# Patient Record
Sex: FEMALE | Race: BLACK OR AFRICAN AMERICAN | Employment: OTHER | URBAN - METROPOLITAN AREA
[De-identification: names, ages, dates, MRNs, and addresses within clinical notes are randomized per-mention and may not be internally consistent; named-entity substitution may affect disease eponyms.]

---

## 2017-01-09 ENCOUNTER — TELEPHONE (OUTPATIENT)
Dept: FAMILY MEDICINE CLINIC | Facility: CLINIC | Age: 62
End: 2017-01-09

## 2017-01-19 ENCOUNTER — TELEPHONE (OUTPATIENT)
Dept: FAMILY MEDICINE CLINIC | Facility: CLINIC | Age: 62
End: 2017-01-19

## 2017-01-25 ENCOUNTER — CASE MANAGEMENT (OUTPATIENT)
Dept: CASE MANAGEMENT | Age: 62
End: 2017-01-25

## 2017-01-26 ENCOUNTER — OFFICE VISIT (OUTPATIENT)
Dept: FAMILY MEDICINE CLINIC | Facility: CLINIC | Age: 62
End: 2017-01-26

## 2017-01-26 VITALS
HEIGHT: 64 IN | SYSTOLIC BLOOD PRESSURE: 145 MMHG | BODY MASS INDEX: 24.84 KG/M2 | OXYGEN SATURATION: 98 % | DIASTOLIC BLOOD PRESSURE: 84 MMHG | WEIGHT: 145.5 LBS | HEART RATE: 88 BPM

## 2017-01-26 DIAGNOSIS — G83.9 PARESIS (HCC): ICD-10-CM

## 2017-01-26 DIAGNOSIS — R04.0 EPISTAXIS: ICD-10-CM

## 2017-01-26 DIAGNOSIS — I10 HTN (HYPERTENSION), BENIGN: ICD-10-CM

## 2017-01-26 DIAGNOSIS — Z79.4 UNCONTROLLED TYPE 2 DIABETES MELLITUS WITH HYPERGLYCEMIA, WITH LONG-TERM CURRENT USE OF INSULIN (HCC): ICD-10-CM

## 2017-01-26 DIAGNOSIS — M19.90 ARTHRITIS: Primary | ICD-10-CM

## 2017-01-26 DIAGNOSIS — K62.5 BRBPR (BRIGHT RED BLOOD PER RECTUM): ICD-10-CM

## 2017-01-26 DIAGNOSIS — E11.65 UNCONTROLLED TYPE 2 DIABETES MELLITUS WITH HYPERGLYCEMIA, WITH LONG-TERM CURRENT USE OF INSULIN (HCC): ICD-10-CM

## 2017-01-26 PROCEDURE — 99999 PR OFFICE/OUTPT VISIT,PROCEDURE ONLY: CPT | Performed by: INTERNAL MEDICINE

## 2017-01-26 RX ORDER — NAPROXEN 250 MG/1
500 TABLET ORAL DAILY PRN
Qty: 60 TABLET | Refills: 0 | Status: SHIPPED | OUTPATIENT
Start: 2017-01-26 | End: 2017-02-17 | Stop reason: ALTCHOICE

## 2017-01-26 RX ORDER — TRAZODONE HYDROCHLORIDE 50 MG/1
50 TABLET ORAL NIGHTLY
Qty: 30 TABLET | Refills: 5 | Status: SHIPPED | OUTPATIENT
Start: 2017-01-26 | End: 2017-01-30 | Stop reason: SDUPTHER

## 2017-01-26 RX ORDER — ASPIRIN 81 MG/1
81 TABLET ORAL DAILY
Qty: 30 TABLET | Refills: 3 | Status: SHIPPED | OUTPATIENT
Start: 2017-01-26 | End: 2017-05-17 | Stop reason: SDUPTHER

## 2017-01-26 ASSESSMENT — ENCOUNTER SYMPTOMS
RESPIRATORY NEGATIVE: 1
DIARRHEA: 0
NAUSEA: 0
CONSTIPATION: 1
EYES NEGATIVE: 1
BLOOD IN STOOL: 1

## 2017-01-30 DIAGNOSIS — E11.65 UNCONTROLLED TYPE 2 DIABETES MELLITUS WITH HYPERGLYCEMIA, WITH LONG-TERM CURRENT USE OF INSULIN (HCC): ICD-10-CM

## 2017-01-30 DIAGNOSIS — Z79.4 UNCONTROLLED TYPE 2 DIABETES MELLITUS WITH HYPERGLYCEMIA, WITH LONG-TERM CURRENT USE OF INSULIN (HCC): ICD-10-CM

## 2017-02-08 ENCOUNTER — TELEPHONE (OUTPATIENT)
Dept: FAMILY MEDICINE CLINIC | Facility: CLINIC | Age: 62
End: 2017-02-08

## 2017-02-09 ENCOUNTER — TELEPHONE (OUTPATIENT)
Dept: NEUROLOGY | Age: 62
End: 2017-02-09

## 2017-02-13 ENCOUNTER — CASE MANAGEMENT (OUTPATIENT)
Dept: CASE MANAGEMENT | Age: 62
End: 2017-02-13

## 2017-02-13 RX ORDER — TRAZODONE HYDROCHLORIDE 50 MG/1
50 TABLET ORAL NIGHTLY
Qty: 30 TABLET | Refills: 3 | OUTPATIENT
Start: 2017-02-13 | End: 2017-06-01 | Stop reason: SDUPTHER

## 2017-02-17 ENCOUNTER — CASE MANAGEMENT (OUTPATIENT)
Dept: CASE MANAGEMENT | Age: 62
End: 2017-02-17

## 2017-02-17 ENCOUNTER — OFFICE VISIT (OUTPATIENT)
Dept: FAMILY MEDICINE CLINIC | Facility: CLINIC | Age: 62
End: 2017-02-17

## 2017-02-17 VITALS
OXYGEN SATURATION: 98 % | BODY MASS INDEX: 25.59 KG/M2 | DIASTOLIC BLOOD PRESSURE: 80 MMHG | HEART RATE: 89 BPM | WEIGHT: 149.91 LBS | SYSTOLIC BLOOD PRESSURE: 123 MMHG | HEIGHT: 64 IN

## 2017-02-17 DIAGNOSIS — R52 GENERALIZED PAIN: Primary | ICD-10-CM

## 2017-02-17 DIAGNOSIS — M25.572 ACUTE LEFT ANKLE PAIN: ICD-10-CM

## 2017-02-17 DIAGNOSIS — R09.89 CHEST CONGESTION: ICD-10-CM

## 2017-02-17 PROCEDURE — 99999 PR OFFICE/OUTPT VISIT,PROCEDURE ONLY: CPT | Performed by: FAMILY MEDICINE

## 2017-02-17 ASSESSMENT — ENCOUNTER SYMPTOMS: SHORTNESS OF BREATH: 0

## 2017-02-20 ENCOUNTER — CASE MANAGEMENT (OUTPATIENT)
Dept: CASE MANAGEMENT | Age: 62
End: 2017-02-20

## 2017-03-01 ENCOUNTER — TELEPHONE (OUTPATIENT)
Dept: FAMILY MEDICINE CLINIC | Facility: CLINIC | Age: 62
End: 2017-03-01

## 2017-03-14 ENCOUNTER — TELEPHONE (OUTPATIENT)
Dept: FAMILY MEDICINE CLINIC | Facility: CLINIC | Age: 62
End: 2017-03-14

## 2017-03-17 ENCOUNTER — OFFICE VISIT (OUTPATIENT)
Dept: FAMILY MEDICINE CLINIC | Facility: CLINIC | Age: 62
End: 2017-03-17

## 2017-03-17 VITALS
OXYGEN SATURATION: 98 % | HEART RATE: 95 BPM | RESPIRATION RATE: 15 BRPM | WEIGHT: 145.5 LBS | DIASTOLIC BLOOD PRESSURE: 86 MMHG | BODY MASS INDEX: 24.84 KG/M2 | SYSTOLIC BLOOD PRESSURE: 136 MMHG | HEIGHT: 64 IN

## 2017-03-17 DIAGNOSIS — L97.529 DIABETIC ULCER OF LEFT FOOT ASSOCIATED WITH TYPE 2 DIABETES MELLITUS (HCC): Primary | ICD-10-CM

## 2017-03-17 DIAGNOSIS — E11.621 DIABETIC ULCER OF LEFT FOOT ASSOCIATED WITH TYPE 2 DIABETES MELLITUS (HCC): Primary | ICD-10-CM

## 2017-03-17 PROCEDURE — 99999 PR OFFICE/OUTPT VISIT,PROCEDURE ONLY: CPT | Performed by: FAMILY MEDICINE

## 2017-03-17 RX ORDER — FLUCONAZOLE 150 MG/1
150 TABLET ORAL ONCE
Qty: 2 TABLET | Refills: 0 | Status: SHIPPED | OUTPATIENT
Start: 2017-03-17 | End: 2017-03-17

## 2017-03-17 RX ORDER — SULFAMETHOXAZOLE AND TRIMETHOPRIM 800; 160 MG/1; MG/1
1 TABLET ORAL 2 TIMES DAILY
Qty: 20 TABLET | Refills: 0 | Status: SHIPPED | OUTPATIENT
Start: 2017-03-17 | End: 2017-03-27

## 2017-03-20 ENCOUNTER — TELEPHONE (OUTPATIENT)
Dept: FAMILY MEDICINE CLINIC | Facility: CLINIC | Age: 62
End: 2017-03-20

## 2017-03-23 ENCOUNTER — HOSPITAL ENCOUNTER (OUTPATIENT)
Dept: OTHER | Age: 62
Discharge: HOME | End: 2017-03-23
Attending: FAMILY MEDICINE | Admitting: FAMILY MEDICINE

## 2017-03-23 ENCOUNTER — OFFICE VISIT (OUTPATIENT)
Dept: NEUROLOGY | Age: 62
End: 2017-03-23

## 2017-03-23 ENCOUNTER — HOSPITAL ENCOUNTER (OUTPATIENT)
Dept: WOUND CARE | Age: 62
Discharge: HOME | End: 2017-03-23
Attending: FAMILY MEDICINE | Admitting: FAMILY MEDICINE

## 2017-03-23 VITALS
WEIGHT: 148 LBS | HEIGHT: 64 IN | BODY MASS INDEX: 25.27 KG/M2 | HEART RATE: 76 BPM | DIASTOLIC BLOOD PRESSURE: 74 MMHG | SYSTOLIC BLOOD PRESSURE: 138 MMHG

## 2017-03-23 VITALS — RESPIRATION RATE: 16 BRPM | HEART RATE: 85 BPM | SYSTOLIC BLOOD PRESSURE: 124 MMHG | DIASTOLIC BLOOD PRESSURE: 73 MMHG

## 2017-03-23 DIAGNOSIS — E11.42 TYPE 2 DIABETES MELLITUS WITH POLYNEUROPATHY (HCC): ICD-10-CM

## 2017-03-23 DIAGNOSIS — I63.50 CEREBROVASCULAR ACCIDENT (CVA) DUE TO OCCLUSION OF CEREBRAL ARTERY (HCC): Chronic | ICD-10-CM

## 2017-03-23 DIAGNOSIS — R27.0 ATAXIA: Primary | ICD-10-CM

## 2017-03-23 DIAGNOSIS — M79.2 NEUROPATHIC PAIN: ICD-10-CM

## 2017-03-23 PROCEDURE — 11042 DBRDMT SUBQ TIS 1ST 20SQCM/<: CPT | Performed by: FAMILY MEDICINE

## 2017-03-23 PROCEDURE — 99215 OFFICE O/P EST HI 40 MIN: CPT | Performed by: PSYCHIATRY & NEUROLOGY

## 2017-03-23 RX ORDER — LIDOCAINE HYDROCHLORIDE 40 MG/ML
SOLUTION TOPICAL ONCE
Status: DISCONTINUED | OUTPATIENT
Start: 2017-03-23 | End: 2017-03-24 | Stop reason: HOSPADM

## 2017-03-23 ASSESSMENT — PAIN DESCRIPTION - ORIENTATION: ORIENTATION: LEFT

## 2017-03-23 ASSESSMENT — PAIN DESCRIPTION - LOCATION: LOCATION: FOOT

## 2017-03-23 ASSESSMENT — PAIN SCALES - GENERAL: PAINLEVEL_OUTOF10: 7

## 2017-03-23 ASSESSMENT — PAIN DESCRIPTION - PAIN TYPE: TYPE: CHRONIC PAIN

## 2017-03-24 LAB
ESTIMATED AVERAGE GLUCOSE: 174.3 MG/DL
HBA1C MFR BLD: 7.7 %

## 2017-03-25 LAB
GRAM STAIN RESULT: ABNORMAL
ORGANISM: ABNORMAL
WOUND/ABSCESS: ABNORMAL
WOUND/ABSCESS: ABNORMAL

## 2017-03-30 ENCOUNTER — HOSPITAL ENCOUNTER (OUTPATIENT)
Dept: WOUND CARE | Age: 62
Discharge: HOME | End: 2017-03-30
Attending: FAMILY MEDICINE | Admitting: FAMILY MEDICINE

## 2017-03-30 VITALS — DIASTOLIC BLOOD PRESSURE: 78 MMHG | SYSTOLIC BLOOD PRESSURE: 130 MMHG | HEART RATE: 85 BPM | RESPIRATION RATE: 16 BRPM

## 2017-03-30 PROCEDURE — 11042 DBRDMT SUBQ TIS 1ST 20SQCM/<: CPT | Performed by: FAMILY MEDICINE

## 2017-03-30 RX ORDER — LIDOCAINE HYDROCHLORIDE 40 MG/ML
SOLUTION TOPICAL ONCE
Status: DISCONTINUED | OUTPATIENT
Start: 2017-03-30 | End: 2017-03-31 | Stop reason: HOSPADM

## 2017-04-06 ENCOUNTER — HOSPITAL ENCOUNTER (OUTPATIENT)
Dept: WOUND CARE | Age: 62
Discharge: HOME | End: 2017-04-06
Attending: FAMILY MEDICINE | Admitting: FAMILY MEDICINE

## 2017-04-06 VITALS
TEMPERATURE: 97.6 F | HEART RATE: 74 BPM | RESPIRATION RATE: 16 BRPM | SYSTOLIC BLOOD PRESSURE: 119 MMHG | DIASTOLIC BLOOD PRESSURE: 68 MMHG

## 2017-04-06 PROCEDURE — 11042 DBRDMT SUBQ TIS 1ST 20SQCM/<: CPT | Performed by: FAMILY MEDICINE

## 2017-04-06 RX ORDER — LIDOCAINE HYDROCHLORIDE 40 MG/ML
SOLUTION TOPICAL ONCE
Status: DISCONTINUED | OUTPATIENT
Start: 2017-04-06 | End: 2017-04-07 | Stop reason: HOSPADM

## 2017-04-20 ENCOUNTER — HOSPITAL ENCOUNTER (OUTPATIENT)
Dept: WOUND CARE | Age: 62
Discharge: HOME | End: 2017-04-20
Attending: FAMILY MEDICINE | Admitting: FAMILY MEDICINE

## 2017-04-20 VITALS — DIASTOLIC BLOOD PRESSURE: 72 MMHG | RESPIRATION RATE: 16 BRPM | HEART RATE: 72 BPM | SYSTOLIC BLOOD PRESSURE: 122 MMHG

## 2017-04-20 DIAGNOSIS — R09.89 DECREASED PULSES IN FEET: Primary | ICD-10-CM

## 2017-04-20 PROCEDURE — 11042 DBRDMT SUBQ TIS 1ST 20SQCM/<: CPT | Performed by: FAMILY MEDICINE

## 2017-04-20 RX ORDER — LIDOCAINE HYDROCHLORIDE 40 MG/ML
SOLUTION TOPICAL ONCE
Status: DISCONTINUED | OUTPATIENT
Start: 2017-04-20 | End: 2017-04-21 | Stop reason: HOSPADM

## 2017-04-20 ASSESSMENT — PAIN DESCRIPTION - PAIN TYPE: TYPE: CHRONIC PAIN

## 2017-04-20 ASSESSMENT — PAIN DESCRIPTION - ORIENTATION: ORIENTATION: LEFT

## 2017-04-20 ASSESSMENT — PAIN DESCRIPTION - LOCATION: LOCATION: FOOT

## 2017-04-20 ASSESSMENT — PAIN SCALES - GENERAL: PAINLEVEL_OUTOF10: 6

## 2017-04-21 ENCOUNTER — OFFICE VISIT (OUTPATIENT)
Dept: FAMILY MEDICINE CLINIC | Facility: CLINIC | Age: 62
End: 2017-04-21

## 2017-04-21 ENCOUNTER — HOSPITAL ENCOUNTER (OUTPATIENT)
Dept: OTHER | Age: 62
Discharge: HOME | End: 2017-04-21
Attending: INTERNAL MEDICINE | Admitting: INTERNAL MEDICINE

## 2017-04-21 VITALS
OXYGEN SATURATION: 99 % | HEIGHT: 64 IN | SYSTOLIC BLOOD PRESSURE: 124 MMHG | HEART RATE: 89 BPM | RESPIRATION RATE: 15 BRPM | WEIGHT: 147.71 LBS | DIASTOLIC BLOOD PRESSURE: 67 MMHG | BODY MASS INDEX: 25.22 KG/M2

## 2017-04-21 DIAGNOSIS — Z79.4 UNCONTROLLED TYPE 2 DIABETES MELLITUS WITH HYPERGLYCEMIA, WITH LONG-TERM CURRENT USE OF INSULIN (HCC): ICD-10-CM

## 2017-04-21 DIAGNOSIS — E78.2 MIXED HYPERLIPIDEMIA: Chronic | ICD-10-CM

## 2017-04-21 DIAGNOSIS — E11.65 UNCONTROLLED TYPE 2 DIABETES MELLITUS WITH HYPERGLYCEMIA, WITH LONG-TERM CURRENT USE OF INSULIN (HCC): Primary | ICD-10-CM

## 2017-04-21 DIAGNOSIS — E11.65 UNCONTROLLED TYPE 2 DIABETES MELLITUS WITH HYPERGLYCEMIA, WITH LONG-TERM CURRENT USE OF INSULIN (HCC): ICD-10-CM

## 2017-04-21 DIAGNOSIS — Z79.4 UNCONTROLLED TYPE 2 DIABETES MELLITUS WITH HYPERGLYCEMIA, WITH LONG-TERM CURRENT USE OF INSULIN (HCC): Primary | ICD-10-CM

## 2017-04-21 LAB
CHOLESTEROL, TOTAL: 128 MG/DL (ref 0–199)
HDLC SERPL-MCNC: 47 MG/DL (ref 40–60)
LDL CHOLESTEROL CALCULATED: 57 MG/DL
TRIGL SERPL-MCNC: 119 MG/DL (ref 0–150)
VLDLC SERPL CALC-MCNC: 24 MG/DL

## 2017-04-21 PROCEDURE — 99999 PR OFFICE/OUTPT VISIT,PROCEDURE ONLY: CPT | Performed by: FAMILY MEDICINE

## 2017-04-21 ASSESSMENT — ENCOUNTER SYMPTOMS
VOMITING: 0
SHORTNESS OF BREATH: 0
DIARRHEA: 1
ABDOMINAL PAIN: 0
NAUSEA: 0

## 2017-04-22 LAB
ESTIMATED AVERAGE GLUCOSE: 168.6 MG/DL
HBA1C MFR BLD: 7.5 %

## 2017-04-24 ENCOUNTER — TELEPHONE (OUTPATIENT)
Dept: FAMILY MEDICINE CLINIC | Facility: CLINIC | Age: 62
End: 2017-04-24

## 2017-04-24 ENCOUNTER — OFFICE VISIT (OUTPATIENT)
Dept: FAMILY MEDICINE CLINIC | Facility: CLINIC | Age: 62
End: 2017-04-24

## 2017-04-24 VITALS
WEIGHT: 147.71 LBS | HEIGHT: 64 IN | SYSTOLIC BLOOD PRESSURE: 127 MMHG | DIASTOLIC BLOOD PRESSURE: 68 MMHG | BODY MASS INDEX: 25.22 KG/M2 | HEART RATE: 86 BPM | RESPIRATION RATE: 18 BRPM

## 2017-04-24 DIAGNOSIS — K59.04 CHRONIC IDIOPATHIC CONSTIPATION: ICD-10-CM

## 2017-04-24 PROCEDURE — 99999 PR OFFICE/OUTPT VISIT,PROCEDURE ONLY: CPT | Performed by: FAMILY MEDICINE

## 2017-04-27 ENCOUNTER — HOSPITAL ENCOUNTER (OUTPATIENT)
Dept: WOUND CARE | Age: 62
Discharge: HOME | End: 2017-04-27
Attending: FAMILY MEDICINE | Admitting: FAMILY MEDICINE

## 2017-04-27 VITALS
HEART RATE: 75 BPM | TEMPERATURE: 97.8 F | RESPIRATION RATE: 16 BRPM | SYSTOLIC BLOOD PRESSURE: 125 MMHG | DIASTOLIC BLOOD PRESSURE: 69 MMHG

## 2017-04-27 PROCEDURE — 11042 DBRDMT SUBQ TIS 1ST 20SQCM/<: CPT | Performed by: FAMILY MEDICINE

## 2017-04-27 RX ORDER — LIDOCAINE HYDROCHLORIDE 40 MG/ML
SOLUTION TOPICAL ONCE
Status: DISCONTINUED | OUTPATIENT
Start: 2017-04-27 | End: 2017-04-28 | Stop reason: HOSPADM

## 2017-05-04 ENCOUNTER — HOSPITAL ENCOUNTER (OUTPATIENT)
Dept: WOUND CARE | Age: 62
Discharge: HOME | End: 2017-05-04
Attending: FAMILY MEDICINE | Admitting: FAMILY MEDICINE

## 2017-05-04 VITALS — SYSTOLIC BLOOD PRESSURE: 120 MMHG | RESPIRATION RATE: 16 BRPM | HEART RATE: 77 BPM | DIASTOLIC BLOOD PRESSURE: 67 MMHG

## 2017-05-04 PROCEDURE — 11042 DBRDMT SUBQ TIS 1ST 20SQCM/<: CPT | Performed by: FAMILY MEDICINE

## 2017-05-04 RX ORDER — LIDOCAINE HYDROCHLORIDE 40 MG/ML
SOLUTION TOPICAL ONCE
Status: DISCONTINUED | OUTPATIENT
Start: 2017-05-04 | End: 2017-05-05 | Stop reason: HOSPADM

## 2017-05-04 ASSESSMENT — PAIN SCALES - GENERAL: PAINLEVEL_OUTOF10: 6

## 2017-05-04 ASSESSMENT — PAIN DESCRIPTION - PAIN TYPE: TYPE: CHRONIC PAIN

## 2017-05-04 ASSESSMENT — PAIN DESCRIPTION - ORIENTATION: ORIENTATION: LEFT

## 2017-05-04 ASSESSMENT — PAIN DESCRIPTION - LOCATION: LOCATION: FOOT

## 2017-05-11 ENCOUNTER — HOSPITAL ENCOUNTER (OUTPATIENT)
Dept: WOUND CARE | Age: 62
Discharge: HOME | End: 2017-05-11
Attending: FAMILY MEDICINE | Admitting: FAMILY MEDICINE

## 2017-05-11 PROCEDURE — 11042 DBRDMT SUBQ TIS 1ST 20SQCM/<: CPT | Performed by: FAMILY MEDICINE

## 2017-05-11 RX ORDER — LIDOCAINE HYDROCHLORIDE 40 MG/ML
SOLUTION TOPICAL ONCE
Status: DISCONTINUED | OUTPATIENT
Start: 2017-05-11 | End: 2017-05-12 | Stop reason: HOSPADM

## 2017-05-17 ENCOUNTER — TELEPHONE (OUTPATIENT)
Dept: FAMILY MEDICINE CLINIC | Facility: CLINIC | Age: 62
End: 2017-05-17

## 2017-05-17 DIAGNOSIS — I10 ESSENTIAL HYPERTENSION: Primary | ICD-10-CM

## 2017-05-18 ENCOUNTER — HOSPITAL ENCOUNTER (OUTPATIENT)
Dept: WOUND CARE | Age: 62
Discharge: HOME | End: 2017-05-18
Attending: FAMILY MEDICINE | Admitting: FAMILY MEDICINE

## 2017-05-18 VITALS — DIASTOLIC BLOOD PRESSURE: 69 MMHG | HEART RATE: 69 BPM | RESPIRATION RATE: 16 BRPM | SYSTOLIC BLOOD PRESSURE: 109 MMHG

## 2017-05-18 PROCEDURE — 11042 DBRDMT SUBQ TIS 1ST 20SQCM/<: CPT | Performed by: FAMILY MEDICINE

## 2017-05-18 RX ORDER — ASPIRIN 81 MG/1
81 TABLET ORAL DAILY
Qty: 30 TABLET | Refills: 5 | Status: SHIPPED | OUTPATIENT
Start: 2017-05-18 | End: 2018-10-09

## 2017-05-18 RX ORDER — LIDOCAINE HYDROCHLORIDE 40 MG/ML
SOLUTION TOPICAL ONCE
Status: DISCONTINUED | OUTPATIENT
Start: 2017-05-18 | End: 2017-05-19 | Stop reason: HOSPADM

## 2017-05-25 ENCOUNTER — HOSPITAL ENCOUNTER (OUTPATIENT)
Dept: WOUND CARE | Age: 62
Discharge: HOME | End: 2017-05-25
Attending: FAMILY MEDICINE | Admitting: FAMILY MEDICINE

## 2017-05-25 VITALS — HEART RATE: 69 BPM | RESPIRATION RATE: 16 BRPM | SYSTOLIC BLOOD PRESSURE: 140 MMHG | DIASTOLIC BLOOD PRESSURE: 78 MMHG

## 2017-05-25 PROCEDURE — 11042 DBRDMT SUBQ TIS 1ST 20SQCM/<: CPT | Performed by: FAMILY MEDICINE

## 2017-05-25 RX ORDER — LIDOCAINE HYDROCHLORIDE 40 MG/ML
SOLUTION TOPICAL ONCE
Status: DISCONTINUED | OUTPATIENT
Start: 2017-05-25 | End: 2017-05-26 | Stop reason: HOSPADM

## 2017-05-25 ASSESSMENT — PAIN DESCRIPTION - PAIN TYPE: TYPE: CHRONIC PAIN

## 2017-05-25 ASSESSMENT — PAIN SCALES - GENERAL: PAINLEVEL_OUTOF10: 7

## 2017-05-25 ASSESSMENT — PAIN DESCRIPTION - LOCATION: LOCATION: FOOT

## 2017-05-25 ASSESSMENT — PAIN DESCRIPTION - ORIENTATION: ORIENTATION: LEFT

## 2017-05-30 ENCOUNTER — TELEPHONE (OUTPATIENT)
Dept: FAMILY MEDICINE CLINIC | Facility: CLINIC | Age: 62
End: 2017-05-30

## 2017-06-01 ENCOUNTER — HOSPITAL ENCOUNTER (OUTPATIENT)
Dept: WOUND CARE | Age: 62
Discharge: HOME | End: 2017-06-01
Attending: SURGERY | Admitting: SURGERY

## 2017-06-01 VITALS — SYSTOLIC BLOOD PRESSURE: 130 MMHG | TEMPERATURE: 97.3 F | DIASTOLIC BLOOD PRESSURE: 68 MMHG | RESPIRATION RATE: 16 BRPM

## 2017-06-01 DIAGNOSIS — E55.9 VITAMIN D DEFICIENCY: ICD-10-CM

## 2017-06-01 DIAGNOSIS — Z79.4 UNCONTROLLED TYPE 2 DIABETES MELLITUS WITH HYPERGLYCEMIA, WITH LONG-TERM CURRENT USE OF INSULIN (HCC): ICD-10-CM

## 2017-06-01 DIAGNOSIS — E78.2 MIXED HYPERLIPIDEMIA: ICD-10-CM

## 2017-06-01 DIAGNOSIS — I10 ESSENTIAL HYPERTENSION: ICD-10-CM

## 2017-06-01 DIAGNOSIS — K21.9 GASTROESOPHAGEAL REFLUX DISEASE WITHOUT ESOPHAGITIS: ICD-10-CM

## 2017-06-01 DIAGNOSIS — E11.65 UNCONTROLLED TYPE 2 DIABETES MELLITUS WITH HYPERGLYCEMIA, WITH LONG-TERM CURRENT USE OF INSULIN (HCC): ICD-10-CM

## 2017-06-01 RX ORDER — LIDOCAINE HYDROCHLORIDE 40 MG/ML
SOLUTION TOPICAL ONCE
Status: DISCONTINUED | OUTPATIENT
Start: 2017-06-01 | End: 2017-06-02 | Stop reason: HOSPADM

## 2017-06-01 RX ORDER — TRAZODONE HYDROCHLORIDE 50 MG/1
50 TABLET ORAL NIGHTLY
Qty: 15 TABLET | Refills: 0 | Status: SHIPPED | OUTPATIENT
Start: 2017-06-01 | End: 2017-06-15 | Stop reason: SDUPTHER

## 2017-06-01 RX ORDER — ATORVASTATIN CALCIUM 80 MG/1
80 TABLET, FILM COATED ORAL NIGHTLY
Qty: 15 TABLET | Refills: 0 | Status: SHIPPED | OUTPATIENT
Start: 2017-06-01 | End: 2017-06-15 | Stop reason: SDUPTHER

## 2017-06-01 RX ORDER — AMLODIPINE BESYLATE 5 MG/1
5 TABLET ORAL DAILY
Qty: 15 TABLET | Refills: 0 | Status: SHIPPED | OUTPATIENT
Start: 2017-06-01 | End: 2017-06-15 | Stop reason: SDUPTHER

## 2017-06-01 RX ORDER — ERGOCALCIFEROL (VITAMIN D2) 1250 MCG
50000 CAPSULE ORAL WEEKLY
Qty: 2 CAPSULE | Refills: 0 | Status: SHIPPED | OUTPATIENT
Start: 2017-06-01 | End: 2017-06-15 | Stop reason: SDUPTHER

## 2017-06-01 RX ORDER — LANSOPRAZOLE 15 MG/1
15 CAPSULE, DELAYED RELEASE ORAL DAILY
Qty: 15 CAPSULE | Refills: 0 | Status: SHIPPED | OUTPATIENT
Start: 2017-06-01 | End: 2017-06-15 | Stop reason: SDUPTHER

## 2017-06-02 DIAGNOSIS — Z79.4 UNCONTROLLED TYPE 2 DIABETES MELLITUS WITH HYPERGLYCEMIA, WITH LONG-TERM CURRENT USE OF INSULIN (HCC): ICD-10-CM

## 2017-06-02 DIAGNOSIS — E11.65 UNCONTROLLED TYPE 2 DIABETES MELLITUS WITH HYPERGLYCEMIA, WITH LONG-TERM CURRENT USE OF INSULIN (HCC): ICD-10-CM

## 2017-06-08 ENCOUNTER — HOSPITAL ENCOUNTER (OUTPATIENT)
Dept: WOUND CARE | Age: 62
Discharge: HOME | End: 2017-06-08
Attending: SURGERY | Admitting: SURGERY

## 2017-06-08 ENCOUNTER — HOSPITAL ENCOUNTER (OUTPATIENT)
Dept: OTHER | Age: 62
Discharge: HOME | End: 2017-06-08
Attending: SURGERY | Admitting: SURGERY

## 2017-06-08 VITALS — RESPIRATION RATE: 16 BRPM | SYSTOLIC BLOOD PRESSURE: 122 MMHG | HEART RATE: 68 BPM | DIASTOLIC BLOOD PRESSURE: 72 MMHG

## 2017-06-08 DIAGNOSIS — E11.621 DIABETIC ULCER OF LEFT FOOT ASSOCIATED WITH TYPE 2 DIABETES MELLITUS (HCC): ICD-10-CM

## 2017-06-08 DIAGNOSIS — E11.621 DIABETIC ULCER OF LEFT FOOT ASSOCIATED WITH TYPE 2 DIABETES MELLITUS (HCC): Primary | ICD-10-CM

## 2017-06-08 DIAGNOSIS — L97.529 DIABETIC ULCER OF LEFT FOOT ASSOCIATED WITH TYPE 2 DIABETES MELLITUS (HCC): ICD-10-CM

## 2017-06-08 DIAGNOSIS — L97.529 DIABETIC ULCER OF LEFT FOOT ASSOCIATED WITH TYPE 2 DIABETES MELLITUS (HCC): Primary | ICD-10-CM

## 2017-06-08 RX ORDER — LIDOCAINE HYDROCHLORIDE 40 MG/ML
SOLUTION TOPICAL ONCE
Status: DISCONTINUED | OUTPATIENT
Start: 2017-06-08 | End: 2017-06-09 | Stop reason: HOSPADM

## 2017-06-08 ASSESSMENT — PAIN SCALES - GENERAL: PAINLEVEL_OUTOF10: 7

## 2017-06-08 ASSESSMENT — PAIN DESCRIPTION - PAIN TYPE: TYPE: CHRONIC PAIN

## 2017-06-08 ASSESSMENT — PAIN DESCRIPTION - LOCATION: LOCATION: FOOT

## 2017-06-08 ASSESSMENT — PAIN DESCRIPTION - ORIENTATION: ORIENTATION: LEFT

## 2017-06-12 ENCOUNTER — TELEPHONE (OUTPATIENT)
Dept: FAMILY MEDICINE CLINIC | Facility: CLINIC | Age: 62
End: 2017-06-12

## 2017-06-13 ENCOUNTER — TELEPHONE (OUTPATIENT)
Dept: FAMILY MEDICINE CLINIC | Facility: CLINIC | Age: 62
End: 2017-06-13

## 2017-06-14 ENCOUNTER — TELEPHONE (OUTPATIENT)
Dept: FAMILY MEDICINE CLINIC | Facility: CLINIC | Age: 62
End: 2017-06-14

## 2017-06-15 ENCOUNTER — HOSPITAL ENCOUNTER (OUTPATIENT)
Dept: WOUND CARE | Age: 62
Discharge: HOME | End: 2017-06-15
Attending: FAMILY MEDICINE | Admitting: FAMILY MEDICINE

## 2017-06-15 ENCOUNTER — TELEPHONE (OUTPATIENT)
Dept: FAMILY MEDICINE CLINIC | Facility: CLINIC | Age: 62
End: 2017-06-15

## 2017-06-15 VITALS — DIASTOLIC BLOOD PRESSURE: 65 MMHG | SYSTOLIC BLOOD PRESSURE: 112 MMHG | TEMPERATURE: 98.1 F | RESPIRATION RATE: 16 BRPM

## 2017-06-15 DIAGNOSIS — E78.2 MIXED HYPERLIPIDEMIA: ICD-10-CM

## 2017-06-15 DIAGNOSIS — E55.9 VITAMIN D DEFICIENCY: ICD-10-CM

## 2017-06-15 DIAGNOSIS — E11.65 UNCONTROLLED TYPE 2 DIABETES MELLITUS WITH HYPERGLYCEMIA, WITH LONG-TERM CURRENT USE OF INSULIN (HCC): ICD-10-CM

## 2017-06-15 DIAGNOSIS — Z79.4 UNCONTROLLED TYPE 2 DIABETES MELLITUS WITH HYPERGLYCEMIA, WITH LONG-TERM CURRENT USE OF INSULIN (HCC): ICD-10-CM

## 2017-06-15 DIAGNOSIS — I10 ESSENTIAL HYPERTENSION: ICD-10-CM

## 2017-06-15 DIAGNOSIS — F17.210 CIGARETTE NICOTINE DEPENDENCE WITHOUT COMPLICATION: ICD-10-CM

## 2017-06-15 DIAGNOSIS — K21.9 GASTROESOPHAGEAL REFLUX DISEASE WITHOUT ESOPHAGITIS: ICD-10-CM

## 2017-06-15 PROCEDURE — 11042 DBRDMT SUBQ TIS 1ST 20SQCM/<: CPT | Performed by: FAMILY MEDICINE

## 2017-06-15 RX ORDER — TRAZODONE HYDROCHLORIDE 50 MG/1
50 TABLET ORAL NIGHTLY
Qty: 15 TABLET | Refills: 2 | Status: SHIPPED | OUTPATIENT
Start: 2017-06-15 | End: 2017-06-30 | Stop reason: SDUPTHER

## 2017-06-15 RX ORDER — AMLODIPINE BESYLATE 5 MG/1
5 TABLET ORAL DAILY
Qty: 15 TABLET | Refills: 2 | Status: SHIPPED | OUTPATIENT
Start: 2017-06-15 | End: 2018-10-09

## 2017-06-15 RX ORDER — NICOTINE 21 MG/24HR
1 PATCH, TRANSDERMAL 24 HOURS TRANSDERMAL DAILY
Qty: 30 PATCH | Refills: 2 | Status: SHIPPED | OUTPATIENT
Start: 2017-06-15 | End: 2017-08-11 | Stop reason: ALTCHOICE

## 2017-06-15 RX ORDER — LANSOPRAZOLE 15 MG/1
15 CAPSULE, DELAYED RELEASE ORAL DAILY
Qty: 13 CAPSULE | Refills: 0 | Status: SHIPPED | OUTPATIENT
Start: 2017-06-15 | End: 2017-08-11 | Stop reason: ALTCHOICE

## 2017-06-15 RX ORDER — TRAZODONE HYDROCHLORIDE 50 MG/1
100 TABLET ORAL NIGHTLY
Qty: 16 TABLET | Refills: 1 | Status: SHIPPED | OUTPATIENT
Start: 2017-06-15 | End: 2017-08-11 | Stop reason: ALTCHOICE

## 2017-06-15 RX ORDER — ERGOCALCIFEROL (VITAMIN D2) 1250 MCG
50000 CAPSULE ORAL WEEKLY
Qty: 2 CAPSULE | Refills: 2 | Status: SHIPPED | OUTPATIENT
Start: 2017-06-15 | End: 2019-12-03

## 2017-06-15 RX ORDER — LIDOCAINE HYDROCHLORIDE 40 MG/ML
SOLUTION TOPICAL ONCE
Status: DISCONTINUED | OUTPATIENT
Start: 2017-06-15 | End: 2017-06-16 | Stop reason: HOSPADM

## 2017-06-15 RX ORDER — ATORVASTATIN CALCIUM 80 MG/1
80 TABLET, FILM COATED ORAL NIGHTLY
Qty: 15 TABLET | Refills: 2 | Status: SHIPPED | OUTPATIENT
Start: 2017-06-15 | End: 2018-10-09

## 2017-06-15 RX ORDER — LANSOPRAZOLE 15 MG/1
15 CAPSULE, DELAYED RELEASE ORAL DAILY
Qty: 15 CAPSULE | Refills: 2 | Status: SHIPPED | OUTPATIENT
Start: 2017-06-15 | End: 2017-06-30 | Stop reason: SDUPTHER

## 2017-06-15 ASSESSMENT — PAIN DESCRIPTION - LOCATION: LOCATION: FOOT

## 2017-06-15 ASSESSMENT — PAIN DESCRIPTION - PAIN TYPE: TYPE: CHRONIC PAIN

## 2017-06-15 ASSESSMENT — PAIN SCALES - GENERAL: PAINLEVEL_OUTOF10: 8

## 2017-06-16 ENCOUNTER — TELEPHONE (OUTPATIENT)
Dept: FAMILY MEDICINE CLINIC | Facility: CLINIC | Age: 62
End: 2017-06-16

## 2017-06-16 DIAGNOSIS — E11.65 UNCONTROLLED TYPE 2 DIABETES MELLITUS WITH HYPERGLYCEMIA, WITH LONG-TERM CURRENT USE OF INSULIN (HCC): ICD-10-CM

## 2017-06-16 DIAGNOSIS — Z79.4 UNCONTROLLED TYPE 2 DIABETES MELLITUS WITH HYPERGLYCEMIA, WITH LONG-TERM CURRENT USE OF INSULIN (HCC): ICD-10-CM

## 2017-06-22 ENCOUNTER — HOSPITAL ENCOUNTER (OUTPATIENT)
Dept: WOUND CARE | Age: 62
Discharge: HOME | End: 2017-06-22
Attending: FAMILY MEDICINE | Admitting: FAMILY MEDICINE

## 2017-06-22 VITALS
DIASTOLIC BLOOD PRESSURE: 67 MMHG | HEART RATE: 67 BPM | RESPIRATION RATE: 16 BRPM | SYSTOLIC BLOOD PRESSURE: 113 MMHG | TEMPERATURE: 97.4 F

## 2017-06-22 PROCEDURE — 11042 DBRDMT SUBQ TIS 1ST 20SQCM/<: CPT | Performed by: FAMILY MEDICINE

## 2017-06-22 RX ORDER — LIDOCAINE HYDROCHLORIDE 40 MG/ML
SOLUTION TOPICAL ONCE
Status: DISCONTINUED | OUTPATIENT
Start: 2017-06-22 | End: 2017-06-23 | Stop reason: HOSPADM

## 2017-06-22 RX ORDER — TRAMADOL HYDROCHLORIDE 50 MG/1
50 TABLET ORAL EVERY 8 HOURS PRN
Qty: 90 TABLET | Refills: 2 | Status: SHIPPED | OUTPATIENT
Start: 2017-06-22 | End: 2017-07-22

## 2017-06-29 ENCOUNTER — HOSPITAL ENCOUNTER (OUTPATIENT)
Dept: WOUND CARE | Age: 62
Discharge: HOME | End: 2017-06-29
Attending: FAMILY MEDICINE | Admitting: FAMILY MEDICINE

## 2017-06-29 VITALS — SYSTOLIC BLOOD PRESSURE: 132 MMHG | RESPIRATION RATE: 16 BRPM | HEART RATE: 82 BPM | DIASTOLIC BLOOD PRESSURE: 76 MMHG

## 2017-06-29 PROCEDURE — 11042 DBRDMT SUBQ TIS 1ST 20SQCM/<: CPT | Performed by: FAMILY MEDICINE

## 2017-06-29 RX ORDER — FLUCONAZOLE 150 MG/1
150 TABLET ORAL ONCE
Qty: 2 TABLET | Refills: 0 | Status: SHIPPED | OUTPATIENT
Start: 2017-06-29 | End: 2017-06-29

## 2017-06-29 RX ORDER — LIDOCAINE HYDROCHLORIDE 40 MG/ML
SOLUTION TOPICAL ONCE
Status: DISCONTINUED | OUTPATIENT
Start: 2017-06-29 | End: 2017-06-30 | Stop reason: HOSPADM

## 2017-06-30 ENCOUNTER — OFFICE VISIT (OUTPATIENT)
Dept: FAMILY MEDICINE CLINIC | Facility: CLINIC | Age: 62
End: 2017-06-30

## 2017-06-30 VITALS
HEIGHT: 64 IN | DIASTOLIC BLOOD PRESSURE: 70 MMHG | WEIGHT: 149.91 LBS | SYSTOLIC BLOOD PRESSURE: 133 MMHG | BODY MASS INDEX: 25.59 KG/M2 | OXYGEN SATURATION: 99 % | RESPIRATION RATE: 15 BRPM | HEART RATE: 81 BPM

## 2017-06-30 DIAGNOSIS — E78.2 MIXED HYPERLIPIDEMIA: Primary | Chronic | ICD-10-CM

## 2017-06-30 DIAGNOSIS — I25.10 CORONARY ARTERY DISEASE INVOLVING NATIVE CORONARY ARTERY OF NATIVE HEART WITHOUT ANGINA PECTORIS: Chronic | ICD-10-CM

## 2017-06-30 DIAGNOSIS — K21.9 GASTROESOPHAGEAL REFLUX DISEASE WITHOUT ESOPHAGITIS: ICD-10-CM

## 2017-06-30 DIAGNOSIS — F17.200 SMOKING: ICD-10-CM

## 2017-06-30 DIAGNOSIS — Z79.4 TYPE 2 DIABETES MELLITUS WITH DIABETIC POLYNEUROPATHY, WITH LONG-TERM CURRENT USE OF INSULIN (HCC): ICD-10-CM

## 2017-06-30 DIAGNOSIS — E11.42 TYPE 2 DIABETES MELLITUS WITH DIABETIC POLYNEUROPATHY, WITH LONG-TERM CURRENT USE OF INSULIN (HCC): ICD-10-CM

## 2017-06-30 DIAGNOSIS — I10 ESSENTIAL HYPERTENSION: ICD-10-CM

## 2017-06-30 DIAGNOSIS — C50.412 MALIGNANT NEOPLASM OF UPPER-OUTER QUADRANT OF LEFT FEMALE BREAST (HCC): ICD-10-CM

## 2017-06-30 PROCEDURE — 99999 PR OFFICE/OUTPT VISIT,PROCEDURE ONLY: CPT | Performed by: FAMILY MEDICINE

## 2017-06-30 RX ORDER — FLUCONAZOLE 150 MG/1
150 TABLET ORAL ONCE
Qty: 1 TABLET | Refills: 0 | Status: SHIPPED | OUTPATIENT
Start: 2017-06-30 | End: 2017-06-30

## 2017-07-05 ENCOUNTER — HOSPITAL ENCOUNTER (OUTPATIENT)
Dept: MAMMOGRAPHY | Age: 62
Discharge: HOME | End: 2017-07-05
Attending: FAMILY MEDICINE | Admitting: FAMILY MEDICINE

## 2017-07-05 DIAGNOSIS — Z79.4 TYPE 2 DIABETES MELLITUS WITH DIABETIC POLYNEUROPATHY, WITH LONG-TERM CURRENT USE OF INSULIN (HCC): ICD-10-CM

## 2017-07-05 DIAGNOSIS — E11.42 TYPE 2 DIABETES MELLITUS WITH DIABETIC POLYNEUROPATHY, WITH LONG-TERM CURRENT USE OF INSULIN (HCC): ICD-10-CM

## 2017-07-05 DIAGNOSIS — C50.412 MALIGNANT NEOPLASM OF UPPER-OUTER QUADRANT OF LEFT FEMALE BREAST (HCC): ICD-10-CM

## 2017-07-05 LAB
ANION GAP SERPL CALCULATED.3IONS-SCNC: 13 MMOL/L (ref 3–16)
BUN BLDV-MCNC: 17 MG/DL (ref 7–20)
CALCIUM SERPL-MCNC: 9.4 MG/DL (ref 8.3–10.6)
CHLORIDE BLD-SCNC: 105 MMOL/L (ref 99–110)
CO2: 23 MMOL/L (ref 21–32)
CREAT SERPL-MCNC: 0.9 MG/DL (ref 0.6–1.2)
GFR AFRICAN AMERICAN: >60
GFR NON-AFRICAN AMERICAN: >60
GLUCOSE BLD-MCNC: 197 MG/DL (ref 70–99)
POTASSIUM SERPL-SCNC: 3.9 MMOL/L (ref 3.5–5.1)
SODIUM BLD-SCNC: 141 MMOL/L (ref 136–145)

## 2017-07-07 ENCOUNTER — TELEPHONE (OUTPATIENT)
Dept: FAMILY MEDICINE CLINIC | Facility: CLINIC | Age: 62
End: 2017-07-07

## 2017-07-13 ENCOUNTER — HOSPITAL ENCOUNTER (OUTPATIENT)
Dept: WOUND CARE | Age: 62
Discharge: HOME | End: 2017-07-13
Attending: FAMILY MEDICINE | Admitting: FAMILY MEDICINE

## 2017-07-13 VITALS — RESPIRATION RATE: 14 BRPM | HEART RATE: 72 BPM | SYSTOLIC BLOOD PRESSURE: 123 MMHG | DIASTOLIC BLOOD PRESSURE: 79 MMHG

## 2017-07-13 PROCEDURE — 11042 DBRDMT SUBQ TIS 1ST 20SQCM/<: CPT | Performed by: FAMILY MEDICINE

## 2017-07-13 RX ORDER — LIDOCAINE HYDROCHLORIDE 40 MG/ML
SOLUTION TOPICAL ONCE
Status: DISCONTINUED | OUTPATIENT
Start: 2017-07-13 | End: 2017-07-14 | Stop reason: HOSPADM

## 2017-07-13 ASSESSMENT — PAIN DESCRIPTION - LOCATION: LOCATION: FOOT

## 2017-07-13 ASSESSMENT — PAIN DESCRIPTION - PAIN TYPE: TYPE: CHRONIC PAIN

## 2017-07-13 ASSESSMENT — PAIN DESCRIPTION - ORIENTATION: ORIENTATION: LEFT

## 2017-07-13 ASSESSMENT — PAIN SCALES - GENERAL: PAINLEVEL_OUTOF10: 8

## 2017-07-14 ENCOUNTER — OFFICE VISIT (OUTPATIENT)
Dept: FAMILY MEDICINE CLINIC | Facility: CLINIC | Age: 62
End: 2017-07-14

## 2017-07-14 VITALS
DIASTOLIC BLOOD PRESSURE: 71 MMHG | BODY MASS INDEX: 25.41 KG/M2 | OXYGEN SATURATION: 99 % | SYSTOLIC BLOOD PRESSURE: 137 MMHG | WEIGHT: 148.81 LBS | HEART RATE: 86 BPM | RESPIRATION RATE: 15 BRPM | HEIGHT: 64 IN

## 2017-07-14 DIAGNOSIS — J44.9 CHRONIC OBSTRUCTIVE PULMONARY DISEASE, UNSPECIFIED COPD TYPE (HCC): ICD-10-CM

## 2017-07-14 DIAGNOSIS — C50.412 MALIGNANT NEOPLASM OF UPPER-OUTER QUADRANT OF LEFT FEMALE BREAST (HCC): ICD-10-CM

## 2017-07-14 DIAGNOSIS — I10 HTN (HYPERTENSION), BENIGN: ICD-10-CM

## 2017-07-14 DIAGNOSIS — L97.529 DIABETIC ULCER OF LEFT FOOT ASSOCIATED WITH TYPE 2 DIABETES MELLITUS (HCC): ICD-10-CM

## 2017-07-14 DIAGNOSIS — E11.65 UNCONTROLLED TYPE 2 DIABETES MELLITUS WITH HYPERGLYCEMIA, WITH LONG-TERM CURRENT USE OF INSULIN (HCC): ICD-10-CM

## 2017-07-14 DIAGNOSIS — E11.319 DIABETIC RETINOPATHY ASSOCIATED WITH TYPE 2 DIABETES MELLITUS, MACULAR EDEMA PRESENCE UNSPECIFIED, UNSPECIFIED RETINOPATHY SEVERITY: ICD-10-CM

## 2017-07-14 DIAGNOSIS — E11.621 DIABETIC ULCER OF LEFT FOOT ASSOCIATED WITH TYPE 2 DIABETES MELLITUS (HCC): ICD-10-CM

## 2017-07-14 DIAGNOSIS — F17.200 SMOKING: ICD-10-CM

## 2017-07-14 DIAGNOSIS — Z79.4 UNCONTROLLED TYPE 2 DIABETES MELLITUS WITH HYPERGLYCEMIA, WITH LONG-TERM CURRENT USE OF INSULIN (HCC): ICD-10-CM

## 2017-07-14 DIAGNOSIS — I25.10 CORONARY ARTERY DISEASE INVOLVING NATIVE CORONARY ARTERY OF NATIVE HEART WITHOUT ANGINA PECTORIS: Primary | Chronic | ICD-10-CM

## 2017-07-14 DIAGNOSIS — I48.0 PAROXYSMAL ATRIAL FIBRILLATION (HCC): ICD-10-CM

## 2017-07-14 PROCEDURE — 99999 PR OFFICE/OUTPT VISIT,PROCEDURE ONLY: CPT | Performed by: FAMILY MEDICINE

## 2017-07-20 ENCOUNTER — HOSPITAL ENCOUNTER (OUTPATIENT)
Dept: WOUND CARE | Age: 62
Discharge: HOME | End: 2017-07-20
Attending: FAMILY MEDICINE | Admitting: FAMILY MEDICINE

## 2017-07-20 VITALS — SYSTOLIC BLOOD PRESSURE: 136 MMHG | DIASTOLIC BLOOD PRESSURE: 76 MMHG | RESPIRATION RATE: 16 BRPM | HEART RATE: 80 BPM

## 2017-07-20 PROCEDURE — 11042 DBRDMT SUBQ TIS 1ST 20SQCM/<: CPT | Performed by: FAMILY MEDICINE

## 2017-07-20 RX ORDER — LIDOCAINE HYDROCHLORIDE 40 MG/ML
SOLUTION TOPICAL ONCE
Status: DISCONTINUED | OUTPATIENT
Start: 2017-07-20 | End: 2017-07-21 | Stop reason: HOSPADM

## 2017-07-27 ENCOUNTER — HOSPITAL ENCOUNTER (OUTPATIENT)
Dept: WOUND CARE | Age: 62
Discharge: HOME | End: 2017-07-27
Attending: FAMILY MEDICINE | Admitting: FAMILY MEDICINE

## 2017-07-27 VITALS — RESPIRATION RATE: 16 BRPM | SYSTOLIC BLOOD PRESSURE: 138 MMHG | HEART RATE: 66 BPM | DIASTOLIC BLOOD PRESSURE: 82 MMHG

## 2017-07-27 PROCEDURE — 11042 DBRDMT SUBQ TIS 1ST 20SQCM/<: CPT | Performed by: FAMILY MEDICINE

## 2017-07-27 RX ORDER — LIDOCAINE HYDROCHLORIDE 40 MG/ML
SOLUTION TOPICAL ONCE
Status: DISCONTINUED | OUTPATIENT
Start: 2017-07-27 | End: 2017-07-28 | Stop reason: HOSPADM

## 2017-08-03 ENCOUNTER — HOSPITAL ENCOUNTER (OUTPATIENT)
Dept: WOUND CARE | Age: 62
Discharge: HOME | End: 2017-08-03
Attending: FAMILY MEDICINE | Admitting: FAMILY MEDICINE

## 2017-08-03 VITALS
RESPIRATION RATE: 16 BRPM | SYSTOLIC BLOOD PRESSURE: 119 MMHG | DIASTOLIC BLOOD PRESSURE: 68 MMHG | HEART RATE: 71 BPM | TEMPERATURE: 97.2 F

## 2017-08-03 PROCEDURE — 11042 DBRDMT SUBQ TIS 1ST 20SQCM/<: CPT | Performed by: FAMILY MEDICINE

## 2017-08-03 ASSESSMENT — PAIN DESCRIPTION - DESCRIPTORS: DESCRIPTORS: ACHING

## 2017-08-03 ASSESSMENT — PAIN DESCRIPTION - LOCATION: LOCATION: FOOT

## 2017-08-03 ASSESSMENT — PAIN DESCRIPTION - ORIENTATION: ORIENTATION: LEFT

## 2017-08-03 ASSESSMENT — PAIN DESCRIPTION - FREQUENCY: FREQUENCY: CONTINUOUS

## 2017-08-03 ASSESSMENT — PAIN DESCRIPTION - PROGRESSION: CLINICAL_PROGRESSION: NOT CHANGED

## 2017-08-03 ASSESSMENT — PAIN DESCRIPTION - PAIN TYPE: TYPE: CHRONIC PAIN

## 2017-08-03 ASSESSMENT — PAIN SCALES - GENERAL: PAINLEVEL_OUTOF10: 7

## 2017-08-10 ENCOUNTER — HOSPITAL ENCOUNTER (OUTPATIENT)
Dept: VASCULAR LAB | Age: 62
Discharge: HOME | End: 2017-08-10
Attending: FAMILY MEDICINE | Admitting: FAMILY MEDICINE

## 2017-08-10 ENCOUNTER — HOSPITAL ENCOUNTER (OUTPATIENT)
Dept: WOUND CARE | Age: 62
Discharge: HOME | End: 2017-08-10
Attending: FAMILY MEDICINE | Admitting: FAMILY MEDICINE

## 2017-08-10 DIAGNOSIS — R09.89 DECREASED PULSES IN FEET: ICD-10-CM

## 2017-08-10 DIAGNOSIS — R09.89 OTHER SPECIFIED SYMPTOMS AND SIGNS INVOLVING THE CIRCULATORY AND RESPIRATORY SYSTEMS: ICD-10-CM

## 2017-08-11 ENCOUNTER — HOSPITAL ENCOUNTER (OUTPATIENT)
Dept: WOUND CARE | Age: 62
Discharge: HOME | End: 2017-08-11
Attending: PODIATRIST | Admitting: PODIATRIST

## 2017-08-11 VITALS
SYSTOLIC BLOOD PRESSURE: 119 MMHG | TEMPERATURE: 97.7 F | WEIGHT: 151.3 LBS | BODY MASS INDEX: 25.97 KG/M2 | DIASTOLIC BLOOD PRESSURE: 72 MMHG | HEART RATE: 70 BPM

## 2017-08-11 RX ORDER — LIDOCAINE HYDROCHLORIDE 40 MG/ML
SOLUTION TOPICAL ONCE
Status: DISCONTINUED | OUTPATIENT
Start: 2017-08-11 | End: 2017-08-12 | Stop reason: HOSPADM

## 2017-08-11 RX ORDER — ESOMEPRAZOLE MAGNESIUM 20 MG/1
20 FOR SUSPENSION ORAL DAILY
Status: ON HOLD | COMMUNITY
End: 2018-11-22 | Stop reason: ALTCHOICE

## 2017-08-11 ASSESSMENT — PAIN DESCRIPTION - ORIENTATION: ORIENTATION: LEFT

## 2017-08-11 ASSESSMENT — PAIN DESCRIPTION - LOCATION: LOCATION: FOOT

## 2017-08-11 ASSESSMENT — PAIN DESCRIPTION - DESCRIPTORS: DESCRIPTORS: ACHING

## 2017-08-11 ASSESSMENT — PAIN DESCRIPTION - ONSET: ONSET: ON-GOING

## 2017-08-11 ASSESSMENT — PAIN DESCRIPTION - FREQUENCY: FREQUENCY: CONTINUOUS

## 2017-08-11 ASSESSMENT — PAIN DESCRIPTION - PAIN TYPE: TYPE: CHRONIC PAIN

## 2017-08-15 ENCOUNTER — TELEPHONE (OUTPATIENT)
Dept: SURGERY | Age: 62
End: 2017-08-15

## 2017-08-16 ENCOUNTER — TELEPHONE (OUTPATIENT)
Dept: SURGERY | Age: 62
End: 2017-08-16

## 2017-08-17 ENCOUNTER — HOSPITAL ENCOUNTER (OUTPATIENT)
Dept: WOUND CARE | Age: 62
Discharge: HOME | End: 2017-08-17
Attending: FAMILY MEDICINE | Admitting: FAMILY MEDICINE

## 2017-08-17 VITALS
DIASTOLIC BLOOD PRESSURE: 72 MMHG | TEMPERATURE: 97.2 F | HEART RATE: 89 BPM | SYSTOLIC BLOOD PRESSURE: 133 MMHG | RESPIRATION RATE: 16 BRPM

## 2017-08-17 PROCEDURE — 11042 DBRDMT SUBQ TIS 1ST 20SQCM/<: CPT | Performed by: FAMILY MEDICINE

## 2017-08-17 RX ORDER — LIDOCAINE HYDROCHLORIDE 40 MG/ML
SOLUTION TOPICAL ONCE
Status: DISCONTINUED | OUTPATIENT
Start: 2017-08-17 | End: 2017-08-18 | Stop reason: HOSPADM

## 2017-08-22 PROBLEM — I70.244 ATHEROSCLEROSIS OF NATIVE ARTERY OF LEFT LOWER EXTREMITY WITH ULCERATION OF MIDFOOT (HCC): Status: ACTIVE | Noted: 2017-08-22

## 2017-08-22 RX ORDER — SODIUM CHLORIDE 9 MG/ML
INJECTION, SOLUTION INTRAVENOUS CONTINUOUS
Status: CANCELLED | OUTPATIENT
Start: 2017-08-22

## 2017-08-22 RX ORDER — SODIUM CHLORIDE 0.9 % (FLUSH) 0.9 %
10 SYRINGE (ML) INJECTION EVERY 12 HOURS SCHEDULED
Status: CANCELLED | OUTPATIENT
Start: 2017-08-22

## 2017-08-22 RX ORDER — SODIUM CHLORIDE 0.9 % (FLUSH) 0.9 %
10 SYRINGE (ML) INJECTION PRN
Status: CANCELLED | OUTPATIENT
Start: 2017-08-22

## 2017-08-23 ENCOUNTER — HOSPITAL ENCOUNTER (OUTPATIENT)
Dept: CARDIAC CATH/INVASIVE PROCEDURES | Age: 62
Discharge: HOME | End: 2017-09-11
Attending: SURGERY | Admitting: SURGERY

## 2017-08-23 DIAGNOSIS — I70.244 ATHEROSCLEROSIS OF NATIVE ARTERY OF LEFT LOWER EXTREMITY WITH ULCERATION OF MIDFOOT (HCC): ICD-10-CM

## 2017-08-24 ENCOUNTER — TELEPHONE (OUTPATIENT)
Dept: SURGERY | Age: 62
End: 2017-08-24

## 2017-08-30 ENCOUNTER — HOSPITAL ENCOUNTER (OUTPATIENT)
Dept: CARDIAC CATH/INVASIVE PROCEDURES | Age: 62
Discharge: HOME | End: 2017-08-30
Attending: SURGERY | Admitting: SURGERY

## 2017-08-30 VITALS — WEIGHT: 145 LBS | HEIGHT: 64 IN | BODY MASS INDEX: 24.75 KG/M2

## 2017-08-30 DIAGNOSIS — I70.244 ATHEROSCLEROSIS OF NATIVE ARTERY OF LEFT LOWER EXTREMITY WITH ULCERATION OF MIDFOOT (HCC): Primary | ICD-10-CM

## 2017-08-30 LAB
ANION GAP SERPL CALCULATED.3IONS-SCNC: 10 MMOL/L (ref 3–16)
BUN BLDV-MCNC: 14 MG/DL (ref 7–20)
CALCIUM IONIZED: 1.09 MMOL/L (ref 1.12–1.32)
CALCIUM SERPL-MCNC: 9.3 MG/DL (ref 8.3–10.6)
CHLORIDE BLD-SCNC: 104 MMOL/L (ref 99–110)
CO2: 26 MMOL/L (ref 21–32)
CREAT SERPL-MCNC: 0.9 MG/DL (ref 0.6–1.2)
GFR AFRICAN AMERICAN: >60
GFR AFRICAN AMERICAN: >60
GFR NON-AFRICAN AMERICAN: 50
GFR NON-AFRICAN AMERICAN: >60
GLUCOSE BLD-MCNC: 122 MG/DL (ref 70–99)
GLUCOSE BLD-MCNC: 132 MG/DL (ref 70–99)
HCT VFR BLD CALC: 41.1 % (ref 36–48)
HEMOGLOBIN: 13.6 G/DL (ref 12–16)
HEMOGLOBIN: 14 GM/DL (ref 12–16)
MCH RBC QN AUTO: 29.9 PG (ref 26–34)
MCHC RBC AUTO-ENTMCNC: 33 G/DL (ref 31–36)
MCV RBC AUTO: 90.6 FL (ref 80–100)
PDW BLD-RTO: 13.9 % (ref 12.4–15.4)
PERFORMED ON: ABNORMAL
PLATELET # BLD: 218 K/UL (ref 135–450)
PMV BLD AUTO: 10.4 FL (ref 5–10.5)
POC ACT LR: 218 SEC
POC CHLORIDE: 111 MMOL/L (ref 99–110)
POC CREATININE: 1.1 MG/DL (ref 0.6–1.2)
POC HEMATOCRIT: 41 % (ref 36–48)
POC POTASSIUM: 3.9 MMOL/L (ref 3.5–5.1)
POC SAMPLE TYPE: ABNORMAL
POC SODIUM: 144 MMOL/L (ref 136–145)
RBC # BLD: 4.53 M/UL (ref 4–5.2)
SODIUM BLD-SCNC: 140 MMOL/L (ref 136–145)
WBC # BLD: 11.7 K/UL (ref 4–11)

## 2017-08-30 PROCEDURE — 37227 PR REVSC OPN/PRQ FEM/POP W/STNT/ATHRC/ANGIOP SM VSL: CPT | Performed by: SURGERY

## 2017-08-30 PROCEDURE — 75774 ARTERY X-RAY EACH VESSEL: CPT | Performed by: SURGERY

## 2017-08-30 PROCEDURE — 76937 US GUIDE VASCULAR ACCESS: CPT | Performed by: SURGERY

## 2017-08-30 PROCEDURE — 75625 CONTRAST EXAM ABDOMINL AORTA: CPT | Performed by: SURGERY

## 2017-08-30 PROCEDURE — 75716 ARTERY X-RAYS ARMS/LEGS: CPT | Performed by: SURGERY

## 2017-08-30 PROCEDURE — 37228 PR REVSC OPN/PRQ TIB/PERO W/ANGIOPLASTY UNI: CPT | Performed by: SURGERY

## 2017-08-30 RX ORDER — SODIUM CHLORIDE 0.9 % (FLUSH) 0.9 %
10 SYRINGE (ML) INJECTION PRN
Status: DISCONTINUED | OUTPATIENT
Start: 2017-08-30 | End: 2017-08-31 | Stop reason: HOSPADM

## 2017-08-30 RX ORDER — SODIUM CHLORIDE 0.9 % (FLUSH) 0.9 %
10 SYRINGE (ML) INJECTION EVERY 12 HOURS SCHEDULED
Status: DISCONTINUED | OUTPATIENT
Start: 2017-08-30 | End: 2017-08-31 | Stop reason: HOSPADM

## 2017-08-30 RX ORDER — SODIUM CHLORIDE 9 MG/ML
INJECTION, SOLUTION INTRAVENOUS CONTINUOUS
Status: DISCONTINUED | OUTPATIENT
Start: 2017-08-30 | End: 2017-08-30 | Stop reason: ALTCHOICE

## 2017-08-30 RX ORDER — SODIUM CHLORIDE 0.9 % (FLUSH) 0.9 %
10 SYRINGE (ML) INJECTION EVERY 12 HOURS SCHEDULED
Status: DISCONTINUED | OUTPATIENT
Start: 2017-08-30 | End: 2017-08-30

## 2017-08-30 RX ORDER — SODIUM CHLORIDE 9 MG/ML
INJECTION, SOLUTION INTRAVENOUS CONTINUOUS
Status: DISCONTINUED | OUTPATIENT
Start: 2017-08-30 | End: 2017-08-30

## 2017-08-30 RX ORDER — 0.9 % SODIUM CHLORIDE 0.9 %
500 INTRAVENOUS SOLUTION INTRAVENOUS PRN
Status: DISCONTINUED | OUTPATIENT
Start: 2017-08-30 | End: 2017-08-31 | Stop reason: HOSPADM

## 2017-08-30 RX ORDER — SODIUM CHLORIDE 0.9 % (FLUSH) 0.9 %
10 SYRINGE (ML) INJECTION PRN
Status: DISCONTINUED | OUTPATIENT
Start: 2017-08-30 | End: 2017-08-30 | Stop reason: SDUPTHER

## 2017-08-30 RX ORDER — MORPHINE SULFATE 10 MG/ML
2 INJECTION, SOLUTION INTRAMUSCULAR; INTRAVENOUS
Status: ACTIVE | OUTPATIENT
Start: 2017-08-30 | End: 2017-08-30

## 2017-08-30 RX ORDER — ACETAMINOPHEN 325 MG/1
650 TABLET ORAL EVERY 4 HOURS PRN
Status: DISCONTINUED | OUTPATIENT
Start: 2017-08-30 | End: 2017-08-31 | Stop reason: HOSPADM

## 2017-08-30 RX ORDER — SODIUM CHLORIDE 9 MG/ML
INJECTION, SOLUTION INTRAVENOUS CONTINUOUS
Status: DISCONTINUED | OUTPATIENT
Start: 2017-08-30 | End: 2017-08-31 | Stop reason: HOSPADM

## 2017-08-30 RX ORDER — ONDANSETRON 2 MG/ML
4 INJECTION INTRAMUSCULAR; INTRAVENOUS EVERY 6 HOURS PRN
Status: DISCONTINUED | OUTPATIENT
Start: 2017-08-30 | End: 2017-08-31 | Stop reason: HOSPADM

## 2017-08-30 RX ORDER — FENTANYL CITRATE 50 UG/ML
25 INJECTION, SOLUTION INTRAMUSCULAR; INTRAVENOUS
Status: ACTIVE | OUTPATIENT
Start: 2017-08-30 | End: 2017-08-30

## 2017-09-06 ENCOUNTER — TELEPHONE (OUTPATIENT)
Dept: SURGERY | Age: 62
End: 2017-09-06

## 2017-09-06 DIAGNOSIS — I70.244 ATHEROSCLEROSIS OF NATIVE ARTERY OF LEFT LOWER EXTREMITY WITH ULCERATION OF MIDFOOT (HCC): Primary | ICD-10-CM

## 2017-09-07 ENCOUNTER — HOSPITAL ENCOUNTER (OUTPATIENT)
Dept: WOUND CARE | Age: 62
Discharge: HOME | End: 2017-09-07
Attending: FAMILY MEDICINE | Admitting: FAMILY MEDICINE

## 2017-09-07 VITALS
DIASTOLIC BLOOD PRESSURE: 84 MMHG | SYSTOLIC BLOOD PRESSURE: 138 MMHG | TEMPERATURE: 97.4 F | RESPIRATION RATE: 16 BRPM | HEART RATE: 76 BPM

## 2017-09-07 DIAGNOSIS — I70.244 ATHEROSCLEROSIS OF NATIVE ARTERY OF LEFT LOWER EXTREMITY WITH ULCERATION OF MIDFOOT (HCC): Primary | ICD-10-CM

## 2017-09-07 DIAGNOSIS — R09.89 DECREASED PULSES IN FEET: ICD-10-CM

## 2017-09-07 PROCEDURE — 11042 DBRDMT SUBQ TIS 1ST 20SQCM/<: CPT | Performed by: FAMILY MEDICINE

## 2017-09-07 RX ORDER — LIDOCAINE HYDROCHLORIDE 40 MG/ML
SOLUTION TOPICAL ONCE
Status: DISCONTINUED | OUTPATIENT
Start: 2017-09-07 | End: 2017-09-08 | Stop reason: HOSPADM

## 2017-09-13 ENCOUNTER — HOSPITAL ENCOUNTER (OUTPATIENT)
Dept: VASCULAR LAB | Age: 62
Discharge: HOME | End: 2017-09-13
Attending: FAMILY MEDICINE | Admitting: FAMILY MEDICINE

## 2017-09-13 DIAGNOSIS — I70.244 ATHEROSCLEROSIS OF NATIVE ARTERIES OF LEFT LEG WITH ULCERATION OF HEEL AND MIDFOOT (HCC): ICD-10-CM

## 2017-09-13 DIAGNOSIS — I70.244 ATHEROSCLEROSIS OF NATIVE ARTERY OF LEFT LOWER EXTREMITY WITH ULCERATION OF MIDFOOT (HCC): ICD-10-CM

## 2017-09-13 DIAGNOSIS — R09.89 DECREASED PULSES IN FEET: ICD-10-CM

## 2017-09-14 ENCOUNTER — HOSPITAL ENCOUNTER (OUTPATIENT)
Dept: WOUND CARE | Age: 62
Discharge: HOME | End: 2017-09-14
Attending: FAMILY MEDICINE | Admitting: FAMILY MEDICINE

## 2017-09-21 ENCOUNTER — HOSPITAL ENCOUNTER (OUTPATIENT)
Dept: WOUND CARE | Age: 62
Discharge: HOME | End: 2017-09-21
Attending: SURGERY | Admitting: SURGERY

## 2017-09-21 VITALS
DIASTOLIC BLOOD PRESSURE: 72 MMHG | SYSTOLIC BLOOD PRESSURE: 127 MMHG | TEMPERATURE: 98 F | HEART RATE: 62 BPM | RESPIRATION RATE: 16 BRPM

## 2017-09-21 RX ORDER — LIDOCAINE HYDROCHLORIDE 40 MG/ML
SOLUTION TOPICAL ONCE
Status: DISCONTINUED | OUTPATIENT
Start: 2017-09-21 | End: 2017-09-22 | Stop reason: HOSPADM

## 2017-09-28 ENCOUNTER — HOSPITAL ENCOUNTER (OUTPATIENT)
Dept: WOUND CARE | Age: 62
Discharge: HOME | End: 2017-09-28
Attending: FAMILY MEDICINE | Admitting: FAMILY MEDICINE

## 2017-09-28 VITALS
RESPIRATION RATE: 16 BRPM | SYSTOLIC BLOOD PRESSURE: 111 MMHG | HEART RATE: 65 BPM | DIASTOLIC BLOOD PRESSURE: 70 MMHG | TEMPERATURE: 97.6 F

## 2017-09-28 PROCEDURE — 11042 DBRDMT SUBQ TIS 1ST 20SQCM/<: CPT | Performed by: FAMILY MEDICINE

## 2017-09-28 RX ORDER — LIDOCAINE HYDROCHLORIDE 40 MG/ML
SOLUTION TOPICAL ONCE
Status: DISCONTINUED | OUTPATIENT
Start: 2017-09-28 | End: 2017-09-29 | Stop reason: HOSPADM

## 2017-09-28 ASSESSMENT — PAIN DESCRIPTION - ORIENTATION: ORIENTATION: LEFT

## 2017-09-28 ASSESSMENT — PAIN DESCRIPTION - LOCATION: LOCATION: FOOT;LEG

## 2017-09-28 ASSESSMENT — PAIN SCALES - GENERAL: PAINLEVEL_OUTOF10: 8

## 2017-09-28 ASSESSMENT — PAIN DESCRIPTION - PAIN TYPE: TYPE: CHRONIC PAIN

## 2017-10-02 ENCOUNTER — HOSPITAL ENCOUNTER (OUTPATIENT)
Dept: WOUND CARE | Age: 62
Discharge: HOME | End: 2017-10-02
Attending: SURGERY | Admitting: SURGERY

## 2017-10-02 VITALS
TEMPERATURE: 97 F | DIASTOLIC BLOOD PRESSURE: 72 MMHG | SYSTOLIC BLOOD PRESSURE: 155 MMHG | RESPIRATION RATE: 16 BRPM | HEART RATE: 64 BPM

## 2017-10-02 DIAGNOSIS — I70.244 ATHEROSCLEROSIS OF NATIVE ARTERY OF LEFT LOWER EXTREMITY WITH ULCERATION OF MIDFOOT (HCC): Primary | ICD-10-CM

## 2017-10-02 PROCEDURE — 11042 DBRDMT SUBQ TIS 1ST 20SQCM/<: CPT | Performed by: SURGERY

## 2017-10-02 RX ORDER — LIDOCAINE HYDROCHLORIDE 40 MG/ML
SOLUTION TOPICAL ONCE
Status: DISCONTINUED | OUTPATIENT
Start: 2017-10-02 | End: 2017-10-03 | Stop reason: HOSPADM

## 2017-10-02 ASSESSMENT — PAIN DESCRIPTION - PAIN TYPE: TYPE: CHRONIC PAIN

## 2017-10-02 ASSESSMENT — PAIN DESCRIPTION - DESCRIPTORS: DESCRIPTORS: ACHING

## 2017-10-02 ASSESSMENT — PAIN DESCRIPTION - PROGRESSION: CLINICAL_PROGRESSION: NOT CHANGED

## 2017-10-02 ASSESSMENT — PAIN DESCRIPTION - FREQUENCY: FREQUENCY: CONTINUOUS

## 2017-10-02 ASSESSMENT — PAIN DESCRIPTION - ORIENTATION: ORIENTATION: LEFT

## 2017-10-02 ASSESSMENT — PAIN SCALES - GENERAL: PAINLEVEL_OUTOF10: 8

## 2017-10-02 ASSESSMENT — PAIN DESCRIPTION - ONSET: ONSET: ON-GOING

## 2017-10-02 ASSESSMENT — PAIN DESCRIPTION - LOCATION: LOCATION: FOOT

## 2017-10-02 NOTE — PROGRESS NOTES
NAME:  Lita Good  YOB: 1955  MEDICAL RECORD NUMBER:  [id number]  DATE:  10/2/2017    Dressing applied per orders. Discharge instructions given. Patient verbalized understanding. Return to [facility] in 1 week.            Diabetic Foot/Leg Ulcer      Nursing Diagnosis/Problem    [] Infection related to DFU    [x] Risk for infection related to DFU     [x] Knowledge deficit related to the diagnosis & management of DFU    [x] Knowledge deficit related to control of blood glucose    Goals     [] Non invasive Arterial studies ordered    []  Goal met          [] Goal not met      [x] Patient will verbalize understanding of the diagnosis & management of DFU      [x]  Goal met          [] Goal not met     [x] Patient will verbalize understanding of glucose control      [x]  Goal met          [] Goal not met    [x] Resolution of DFU      []  Goal met          [x] Goal not met           Nursing Interventions/Evaluation    [x] At every visit assess bilateral feet    [x] Provide education on DFU and glucose control    [x] Evaluate for HBO    [x] Provide education on offloading         Diagnostic/Treatment activity    [] antibiotics    [] X-ray     [] Culture   [x] Debridement      [] HBO Evaluation    [] CBC   [] ESR   [] CRP  [] Hgb A1c  [] Vascular Studies    [] KATIUSKA's    Electronically signed by Angy Warner RN on 10/2/2017 at 10:50 AM

## 2017-10-02 NOTE — IP AVS SNAPSHOT
Patient Information     Patient Name JERSON Newby 1955      Important Information for Stroke      If you have a current diagnosis or history of any of the following, please review the information carefully. STROKE PATIENTS:  If you notice any sign or symptom that indicates that you may be having a stroke, activate the emergency medical services immediately by calling [id number]. These symptoms include: uneven facial expression, arm(s) drifting down, strange speech or loss of speech, vision problems, sudden severe headache, sudden numbness or face/arms/legs, sudden confusion or difficult understanding, sudden dizziness, sudden difficulty with walking. Continue or begin taking the medications prescribed by your physician as listed above. There are personal risk that are associated with Stoke. Anyone can have a stroke no matter your age, race or gender. The chances of having a stroke increase if you have certain risk factors. The best way to protect yourself and loved ones from stroke is to understand your personal risk and how to manage it. There are 2 types of risk factors for stroke: uncontrollable and controllable. Uncontrollable risk factors include being over age 54, being male, being ,  or /, or having a personal or family history of a stroke or transient ischemic attack (TIA). Controllable risk factors generally fall into two categories: lifestyle risk factors or medical risk factors. Lifestyle risk factors can often be changed, while medical risk factors can usually be treated. Both types can be managed best by working with a doctor, who can prescribe medications and advise on how to adopt a healthy lifestyle. Controllable risk factors include high blood pressure, atrial fibrillation, high cholesterol, diabetes, atherosclerosis, circulation problems, tobacco use or smoking, alcohol use, lack of exercise, and obesity.

## 2017-10-02 NOTE — IP AVS SNAPSHOT
After Visit Summary  (Discharge Instructions)    Medication List for Home    Based on the information you provided to us as well as any changes during this visit, the following is your updated medication list.  Compare this with your prescription bottles at home. If you have any questions or concerns, contact your primary care physician's office.              Daily Medication List (This medication list can be shared with any healthcare provider who is helping you manage your medications)      ASK your doctor about these medications if you have questions        Last Dose    Next Dose Due AM NOON PM NIGHT    Alcohol Wipes Pads   Use with testing three times daily                                         amLODIPine 5 MG tablet   Commonly known as:  NORVASC   Take 1 tablet by mouth daily                                         aspirin EC 81 MG EC tablet   Take 1 tablet by mouth daily                                         atorvastatin 80 MG tablet   Commonly known as:  LIPITOR   Take 1 tablet by mouth nightly                                         Blood Glucose System Marcelo Kit   Test three times daily                                         BLOOD GLUCOSE TEST STRIPS Strp   Use as directed - test three times daily                                         ergocalciferol 90761 units capsule   Commonly known as:  ERGOCALCIFEROL   Take 1 capsule by mouth once a week                                         esomeprazole Magnesium 20 MG Pack   Commonly known as:  NEXIUM   Take 20 mg by mouth daily                                         insulin glargine 300 UNIT/ML injection pen   Commonly known as:  TOUJEO SOLOSTAR   Inject 10 Units into the skin nightly                                         insulin lispro 100 UNIT/ML pen   Commonly known as:  HUMALOG   Inject 0-10 Units into the skin 3 times daily (before meals) Less than 130 - no insulin; 130-150 = 5 units, greater than 150 = 10 units This section includes instructions you will need to follow once you leave the hospital.  Your care team will discuss these with you, so you and those caring for you know how to best care for your health needs at home. This section may also include educational information about certain health topics that may be of help to you. Discharge Instructions       [facility], [address] [place] [address] Road  Telephone: [phone]      Discharge Instructions:  Keep weight off wounds and reposition every 2 hours if applicable. Avoid standing for long periods of time.       If wound(s) is on your lower extremity, elevate legs to the level of the heart or above for 30 minutes 4-5 times a day and/or when sitting.       Do not get wounds wet in bath or shower unless otherwise instructed by your physician.       When taking antibiotics take entire prescription as ordered by MD do not stop taking until medicine is all gone. Exercise as tolerated. No Smoking. Smoking prohibits wound healing.      Wound: left lateral foot      With each dressing change, rinse wounds with 0.9% Saline. (May use wound wash or soft contact solution. Both can be purchased at a local drug store). If unable to obtain saline, may use a gentle soap and water.      Dressing care: Stop Smoking. Silver alginate rope,  dry dressing- change every other day.           Next Dressing change due ___as instructed____________  Alfredo  If Vascular testing is ordered, please call [address] [place] [address] [phone]) to schedule if you do not hear from the scheduling department in 1-2 days.      If Vascular testing is scheduled, please bring supplies to replace your dressing after testing is done. The vascular department does not stock supplies.       Important dietary reminders:  1. Increase Protein intake  2. No added salt  3.  If diabetic, good glucose control      Follow up with Dr Hugh Baker In 1 week in the wound care center.   Alfredo Call [phone] for any questions or concerns.       Your  [address]:   [address] Information: Should you experience any significant changes in your wound(s) or have questions about your wound care, please contact the [facility] [address] [phone] Monday - Thursday 8:00 am - 4:00 pm and Friday 8:00 am - 1:00pm. If you need help with your wound outside these hours and cannot wait until we are again available, contact your PCP or go to the hospital emergency room.       PLEASE NOTE: IF YOU ARE UNABLE TO [address], CONTINUE TO USE THE SUPPLIES YOU HAVE AVAILABLE UNTIL YOU ARE ABLE TO [address] [employer] [address]. IT IS MOST IMPORTANT TO KEEP THE WOUND COVERED AT ALL TIMES.      You Next Appointment is:      Date__________ Time____________  Shey Jim  Essential steps to Wound Healing       Arterial/Venous Studies ordered on: 8/10  Debridement (See Flowsheet)  Culture sent on: 3/23  Edema addressed:  Offloading addressed: See Nurses Note Plan of Care  Pain Control:  Host Factors Optimized (DM Control, nutrition, cardiac etc... ): DM, Stroke      Important information for a smoker       SMOKING: QUIT SMOKING. THIS IS THE MOST IMPORTANT ACTION YOU CAN TAKE TO IMPROVE YOUR CURRENT AND FUTURE HEALTH. Call the [address] [place] [address] at [facility] ([phone])    Smoking harms nonsmokers. When nonsmokers are around people who smoke, they absorb nicotine, carbon monoxide, and other ingredients of tobacco smoke. DO NOT SMOKE AROUND CHILDREN     Children exposed to secondhand smoke are at an increased risk of:  Sudden Infant Death Syndrome (SIDS), acute respiratory infections, inflammation of the middle ear, and severe asthma. Over a longer time, it causes heart disease and lung cancer. There is no safe level of exposure to secondhand smoke.                 MyChart Signup

## 2017-10-02 NOTE — PROGRESS NOTES
Age of Onset    Heart Disease Mother     Diabetes Mother     Diabetes Father     Cancer Maternal Grandmother      Breast       SOCIAL HISTORY    Social History   Substance Use Topics    Smoking status: Current Some Day Smoker     Packs/day: 1.00     Years: 40.00     Types: Cigarettes     Last attempt to quit: 8/15/2016    Smokeless tobacco: Never Used    Alcohol use No       ALLERGIES    Allergies   Allergen Reactions    Lyrica [Pregabalin]      Hallucinated with this. Hallucinated with this.  Metformin Diarrhea       MEDICATIONS    Current Outpatient Prescriptions on File Prior to Encounter   Medication Sig Dispense Refill    esomeprazole Magnesium (NEXIUM) 20 MG PACK Take 20 mg by mouth daily      insulin glargine (TOUJEO SOLOSTAR) 300 UNIT/ML injection pen Inject 10 Units into the skin nightly 5 Pen 3    amLODIPine (NORVASC) 5 MG tablet Take 1 tablet by mouth daily 15 tablet 2    atorvastatin (LIPITOR) 80 MG tablet Take 1 tablet by mouth nightly 15 tablet 2    ergocalciferol (ERGOCALCIFEROL) 57861 units capsule Take 1 capsule by mouth once a week 2 capsule 2    aspirin EC 81 MG EC tablet Take 1 tablet by mouth daily 30 tablet 5    Glucose Blood (BLOOD GLUCOSE TEST STRIPS) STRP Use as directed - test three times daily 100 strip 5    Blood Glucose Monitoring Suppl (BLOOD GLUCOSE SYSTEM ALAYNA) KIT Test three times daily 1 kit 0    Lancets MISC Use for each blood sugar test 100 each 5    Alcohol Swabs (ALCOHOL WIPES) PADS Use with testing three times daily 100 each 5    insulin lispro (HUMALOG) 100 UNIT/ML pen Inject 0-10 Units into the skin 3 times daily (before meals) Less than 130 - no insulin; 130-150 = 5 units, greater than 150 = 10 units 2 Pen 2     No current facility-administered medications on file prior to encounter. REVIEW OF SYSTEMS    A comprehensive review of systems was negative.       Objective:      BP (!) 155/72  Pulse 64  Temp 97 °F (36.1 °C) (Oral)   Resp 16    PHYSICAL EXAM    General Appearance: alert and oriented to person, place and time, well developed and well- nourished, in no acute distress  Skin: warm and dry, no rash or erythema  Head: normocephalic and atraumatic  Eyes: pupils equal, round, and reactive to light, extraocular eye movements intact, conjunctivae normal  Neck: supple and non-tender without mass, no thyromegaly or thyroid nodules, no cervical lymphadenopathy  Cardiovascular: normal rate, regular rhythm,  distal pulses intact left foot  Musculoskeletal: normal range of motion, no joint swelling, deformity or tenderness  Neurologic: reflexes normal and symmetric, no cranial nerve deficit, gait, coordination and speech normal      Assessment:     Patient Active Problem List   Diagnosis    Neuropathy ([address]    Mixed hyperlipidemia    Uncontrolled type 2 diabetes mellitus with hyperglycemia, with long-term current use of insulin ([address]    Coronary artery disease    Smoking    Breast cancer, left breast ([address]    DDD (degenerative disc disease), lumbosacral    Diabetic peripheral neuropathy (HCC)    Diabetic retinopathy ([address]    COPD (chronic obstructive pulmonary disease) ([address]    Arthritis    Ataxia    GERD (gastroesophageal reflux disease)    Diabetic polyneuropathy (HCC)    Dermatophytosis of nail    Precordial pain    Weakness of right leg    Cerebrovascular accident (CVA) due to occlusion of cerebral artery ([address]    Essential hypertension    Severe mitral regurgitation    Coronary artery disease involving native coronary artery of native heart without angina pectoris    Acute CVA (cerebrovascular accident) ([address]    Generalized osteoarthritis    Vitamin D deficiency    Dysphagia due to recent stroke    Dysarthria    Paroxysmal atrial fibrillation (HCC)    Hypokalemia    Encounter for electronic analysis of reveal event recorder    Arterial ischemic stroke, ICA, left, acute ([address]    DM (diabetes mellitus), secondary, uncontrolled, w/neurologic complic ([address]    HTN (hypertension), benign    Paresis ([address]    Diabetic ulcer of left foot associated with type 2 diabetes mellitus (HCC)    Decreased pulses in feet    Chronic idiopathic constipation    Diabetic ulcer of toe of left foot associated with type 2 diabetes mellitus, with fat layer exposed ([address]    Atherosclerosis of native artery of left lower extremity with ulceration of midfoot ([address]       Procedure Note    Performed by: Ada Crowell MD    Consent obtained: Yes    Time out taken:  Yes    Pain Control: Anesthetic  Anesthetic: 4% Topical Xylocaine     Debridement:Excisional Debridement    Using curette the wound was sharply debrided    down through and including the removal of epidermis, dermis and subcutaneous tissue. Devitalized Tissue Debrided:  fibrin, biofilm and slough    Pre Debridement Measurements:  Are located in the Wound Documentation Flow Sheet    Wound #: 1     Post  Debridement Measurements:  Diabetic Ulcer 03/23/17 Foot Left;Lateral #1 (Active)   Rosa-wound Assessment Clean 10/2/2017  9:46 AM   Rosa-Wound Texture Scarring 10/2/2017  9:46 AM   Rosa-Wound Moisture Dry 10/2/2017  9:46 AM   Rosa-Wound Color Tan 10/2/2017  9:46 AM   Diabetic Wound - Taiwo Mech 2 10/2/2017  9:46 AM   Wound Assessment Bleeding 10/2/2017 10:46 AM   Wound Length (cm) 0.5 cm 10/2/2017 10:46 AM   Wound Width (cm) 0.3 cm 10/2/2017 10:46 AM   Wound Depth (cm)  0.2 10/2/2017 10:46 AM   Calculated Wound Size (cm^2) (l*w) 0.15 cm^2 10/2/2017 10:46 AM   Change in Wound Size % (l*w) 92.31 10/2/2017 10:46 AM   Dressing Status Clean;Dry; Intact 7/20/2017 11:52 AM   Dressing Changed Changed/New 7/20/2017 11:52 AM   Dressing/Treatment Alginate;Dry dressing; Foam 7/20/2017 11:52 AM   Wound Cleansed Rinsed/Irrigated with saline 10/2/2017  9:46 AM   Granulation Quality Pale 8/3/2017  9:52 AM   Drainage Amount Moderate 10/2/2017 10:46 AM   Drainage Description Serosanguinous

## 2017-10-12 ENCOUNTER — HOSPITAL ENCOUNTER (OUTPATIENT)
Dept: WOUND CARE | Age: 62
Discharge: HOME | End: 2017-10-12
Attending: FAMILY MEDICINE | Admitting: SURGERY

## 2017-10-12 VITALS
DIASTOLIC BLOOD PRESSURE: 81 MMHG | RESPIRATION RATE: 16 BRPM | TEMPERATURE: 97.4 F | SYSTOLIC BLOOD PRESSURE: 131 MMHG | HEART RATE: 58 BPM

## 2017-10-12 PROCEDURE — 11042 DBRDMT SUBQ TIS 1ST 20SQCM/<: CPT | Performed by: FAMILY MEDICINE

## 2017-10-12 RX ORDER — LIDOCAINE HYDROCHLORIDE 40 MG/ML
SOLUTION TOPICAL ONCE
Status: DISCONTINUED | OUTPATIENT
Start: 2017-10-12 | End: 2017-10-13 | Stop reason: HOSPADM

## 2017-10-12 NOTE — PLAN OF CARE
Problem: Wound:  Goal: Will show signs of wound healing; wound closure and no evidence of infection  Will show signs of wound healing; wound closure and no evidence of infection   Outcome: Ongoing  Discharge instructions given. Patient verbalized understanding. Return to [facility] in 1 week.       [] antibiotics    [] X-ray     [] Culture   [x] Debridement      [] HBO Evaluation    [] LABS   [] Vascular Studies []

## 2017-10-12 NOTE — PROGRESS NOTES
Ann Moul[facility]  Progress Note and Procedure Note      Jerome San  AGE: 60 y.o. GENDER: female  : 1955  TODAY'S DATE:  10/12/2017    Subjective:     Chief Complaint   Patient presents with    Wound Check     left foot         HISTORY of PRESENT ILLNESS HPI     Jerome San is a 60 y.o. female who presents today for wound evaluation.    History of Wound: non healing diabetic foot ulcer   Wound Pain:  none  Severity:  0 / 10   Wound Type:  diabetic  Modifying Factors:  diabetes, poor glucose control, chronic pressure and decreased mobility  Associated Signs/Symptoms:  numbness        PAST MEDICAL HISTORY        Diagnosis Date    Allergic rhinitis due to pollen     Arthritis     Breast cancer ([address]     left     Breast cancer, left breast ([address] 2012    chemo and radiation    Bronchitis, mucopurulent recurrent ([address]     Cerebrovascular disease     CVA x 4, last mid 2016    COPD (chronic obstructive pulmonary disease) ([address]     Coronary artery disease     stent 2010    DDD (degenerative disc disease), lumbosacral 2012    Depression     Diabetes mellitus ([address]     Diabetes mellitus out of control ([address] 2004    Diabetic retinopathy ([address] 1/3/2013    Hand pain     and feet neuroapthy from DM    Herniated lumbar disc without myelopathy     HTN (hypertension)     Hyperlipidemia LDL goal <70     Insomnia     Irritable bowel syndrome     Neuropathy ([address]     of feet    osteoporosis     Stroke (cerebrum) ([address] Dec 11 2015    TIA 2012    Tobacco abuse     current       PAST SURGICAL HISTORY    Past Surgical History:   Procedure Laterality Date    BREAST SURGERY  12    left breast segmental mastectomy    COLONOSCOPY      CORONARY ANGIOPLASTY WITH STENT PLACEMENT          CORONARY ANGIOPLASTY WITH STENT PLACEMENT  2014    INSERTABLE CARDIAC MONITOR      TONSILLECTOMY      TUBAL LIGATION      TUNNELED VENOUS PORT PLACEMENT Temp 97.4 °F (36.3 °C) (Oral)   Resp 16     PHYSICAL EXAM    General Appearance: alert and oriented to person, place and time, well-developed and well-nourished, in no acute distress  Skin: warm and dry  Head: normocephalic and atraumatic  Eyes: extraocular eye movements intact and conjunctivae normal  Extremities: no cyanosis and no clubbing  Musculoskeletal: normal range of motion, no joint swelling, deformity or tenderness      Assessment:     Patient Active Problem List   Diagnosis    Neuropathy ([address]    Mixed hyperlipidemia    Uncontrolled type 2 diabetes mellitus with hyperglycemia, with long-term current use of insulin ([address]    Coronary artery disease    Smoking    Breast cancer, left breast ([address]    DDD (degenerative disc disease), lumbosacral    Diabetic peripheral neuropathy (HCC)    Diabetic retinopathy ([address]    COPD (chronic obstructive pulmonary disease) ([address]    Arthritis    Ataxia    GERD (gastroesophageal reflux disease)    Diabetic polyneuropathy (HCC)    Dermatophytosis of nail    Precordial pain    Weakness of right leg    Cerebrovascular accident (CVA) due to occlusion of cerebral artery ([address]    Essential hypertension    Severe mitral regurgitation    Coronary artery disease involving native coronary artery of native heart without angina pectoris    Acute CVA (cerebrovascular accident) ([address]    Generalized osteoarthritis    Vitamin D deficiency    Dysphagia due to recent stroke    Dysarthria    Paroxysmal atrial fibrillation (HCC)    Hypokalemia    Encounter for electronic analysis of reveal event recorder    Arterial ischemic stroke, ICA, left, acute ([address]    DM (diabetes mellitus), secondary, uncontrolled, w/neurologic complic ([address]    HTN (hypertension), benign    Paresis ([address]    Diabetic ulcer of left foot associated with type 2 diabetes mellitus ([address]    Decreased pulses in feet    Chronic idiopathic constipation    Diabetic ulcer of toe of left foot associated with type 2 diabetes mellitus, with fat layer exposed ([address]    Atherosclerosis of native artery of left lower extremity with ulceration of midfoot ([address]       Procedure Note    Performed by: Michael Haynes DO    Consent obtained: Yes    Time out taken:  Yes    Pain Control: Anesthetic  Anesthetic: 4% Topical Xylocaine     Debridement:Excisional Debridement    Using curette the wound was sharply debrided    down through and including the removal of subcutaneous tissue. Devitalized Tissue Debrided:  exudate and callus    Pre Debridement Measurements:  Are located in the Wound Documentation Flow Sheet    Wound #: 1     Post  Debridement Measurements:  Diabetic Ulcer 03/23/17 Foot Left;Lateral #1 (Active)   Rosa-wound Assessment Clean 10/12/2017 10:03 AM   Rosa-Wound Texture Scarring 10/12/2017 10:03 AM   Rosa-Wound Moisture Dry 10/12/2017 10:03 AM   Rosa-Wound Color Tan 10/12/2017 10:03 AM   Diabetic Wound - Pilar Lyons 2 10/12/2017 10:03 AM   Wound Assessment Bleeding 10/12/2017 10:26 AM   Wound Length (cm) 0.3 cm 10/12/2017 10:26 AM   Wound Width (cm) 0.2 cm 10/12/2017 10:26 AM   Wound Depth (cm)  0.2 10/12/2017 10:26 AM   Calculated Wound Size (cm^2) (l*w) 0.06 cm^2 10/12/2017 10:26 AM   Change in Wound Size % (l*w) 96.92 10/12/2017 10:26 AM   Dressing Status Clean;Dry; Intact 10/2/2017 10:57 AM   Dressing Changed Changed/New 10/2/2017 10:57 AM   Dressing/Treatment Alginate;Dry dressing;Silver dressing 10/2/2017 10:57 AM   Wound Cleansed Rinsed/Irrigated with saline 10/12/2017 10:03 AM   Granulation Quality Pale 8/3/2017  9:52 AM   Drainage Amount Moderate 10/12/2017 10:26 AM   Drainage Description Serosanguinous 10/12/2017 10:03 AM   Odor None 10/12/2017 10:03 AM   Epithelialization Large:% 9/7/2017 10:27 AM   Carnuel%Wound Bed 0 10/12/2017 10:03 AM   Red%Wound Bed 0 10/12/2017 10:03 AM   Yellow%Wound Bed 100 10/12/2017 10:03 AM   Black%Wound Bed 0 10/12/2017 10:03 AM   Purple%Wound Bed 0 10/12/2017 10:03 AM   Time out Yes 10/12/2017 10:03 AM   Op First Treatment Date 03/23/17 3/23/2017 11:08 AM   Number of days: 202           Total Surface Area Debrided:  0.06 sq cm     Percentage of wound debrided 100%    Bleeding:  Minimal    Hemostasis Achieved:  by pressure    Procedural Pain:  0  / 10     Post Procedural Pain:  0 / 10     Response to treatment:  Well tolerated by patient. Plan:     The nature of the patient's condition was explained in depth. The patient was informed that their compliance to the treatment plan is paramount to successful healing and prevention of further ulceration and/or infection     Discharge Treatment     Dressing care: Stop Smoking.  Silver alginate rope,  dry dressing- change daily.           Written Patient Discharge Instructions Given            Electronically signed by Yajaira Lake DO on 10/12/2017 at 10:35 AM

## 2017-10-19 ENCOUNTER — HOSPITAL ENCOUNTER (OUTPATIENT)
Dept: WOUND CARE | Age: 62
Discharge: HOME | End: 2017-10-19
Attending: FAMILY MEDICINE | Admitting: FAMILY MEDICINE

## 2017-10-19 VITALS
SYSTOLIC BLOOD PRESSURE: 117 MMHG | DIASTOLIC BLOOD PRESSURE: 71 MMHG | RESPIRATION RATE: 16 BRPM | TEMPERATURE: 98.2 F | HEART RATE: 62 BPM

## 2017-10-19 PROCEDURE — 11042 DBRDMT SUBQ TIS 1ST 20SQCM/<: CPT | Performed by: FAMILY MEDICINE

## 2017-10-19 RX ORDER — LIDOCAINE HYDROCHLORIDE 40 MG/ML
SOLUTION TOPICAL ONCE
Status: DISCONTINUED | OUTPATIENT
Start: 2017-10-19 | End: 2017-10-20 | Stop reason: HOSPADM

## 2017-10-19 NOTE — PLAN OF CARE
Problem: Wound:  Goal: Will show signs of wound healing; wound closure and no evidence of infection  Will show signs of wound healing; wound closure and no evidence of infection  Outcome: Ongoing  Discharge instructions given. Patient verbalized understanding. Return to [facility] in 1 week.       [] antibiotics    [] X-ray     [] Culture   [x] Debridement      [] HBO Evaluation    [] LABS   [] Vascular Studies []

## 2017-10-19 NOTE — PROGRESS NOTES
HPI.      Objective:      /71   Pulse 62   Temp 98.2 °F (36.8 °C) (Oral)   Resp 16     PHYSICAL EXAM    General Appearance: alert and oriented to person, place and time, well-developed and well-nourished, in no acute distress  Skin: warm and dry  Head: normocephalic and atraumatic  Eyes: extraocular eye movements intact and conjunctivae normal  Extremities: no cyanosis and no clubbing  Musculoskeletal: normal range of motion, no joint swelling, deformity or tenderness      Assessment:     Patient Active Problem List   Diagnosis    Neuropathy ([address]    Mixed hyperlipidemia    Uncontrolled type 2 diabetes mellitus with hyperglycemia, with long-term current use of insulin ([address]    Coronary artery disease    Smoking    Breast cancer, left breast ([address]    DDD (degenerative disc disease), lumbosacral    Diabetic peripheral neuropathy (HCC)    Diabetic retinopathy ([address]    COPD (chronic obstructive pulmonary disease) ([address]    Arthritis    Ataxia    GERD (gastroesophageal reflux disease)    Diabetic polyneuropathy (HCC)    Dermatophytosis of nail    Precordial pain    Weakness of right leg    Cerebrovascular accident (CVA) due to occlusion of cerebral artery ([address]    Essential hypertension    Severe mitral regurgitation    Coronary artery disease involving native coronary artery of native heart without angina pectoris    Acute CVA (cerebrovascular accident) ([address]    Generalized osteoarthritis    Vitamin D deficiency    Dysphagia due to recent stroke    Dysarthria    Paroxysmal atrial fibrillation (HCC)    Hypokalemia    Encounter for electronic analysis of reveal event recorder    Arterial ischemic stroke, ICA, left, acute ([address]    DM (diabetes mellitus), secondary, uncontrolled, w/neurologic complic ([address]    HTN (hypertension), benign    Paresis ([address]    Diabetic ulcer of left foot associated with type 2 diabetes mellitus (HCC)    Decreased pulses in feet    Chronic idiopathic constipation    Diabetic ulcer of toe of left foot associated with type 2 diabetes mellitus, with fat layer exposed ([address]    Atherosclerosis of native artery of left lower extremity with ulceration of midfoot ([address]       Procedure Note    Performed by: Chuck Hernandes DO    Consent obtained: Yes    Time out taken:  Yes    Pain Control:       Debridement:Excisional Debridement    Using curette the wound was sharply debrided    down through and including the removal of subcutaneous tissue. Devitalized Tissue Debrided:  callus    Pre Debridement Measurements:  Are located in the Wound Documentation Flow Sheet    Wound #: 1     Post  Debridement Measurements:  Diabetic Ulcer 03/23/17 Foot Left;Lateral #1 (Active)   Rosa-wound Assessment Clean 10/12/2017 10:03 AM   Rosa-Wound Texture Scarring 10/12/2017 10:03 AM   Rosa-Wound Moisture Dry 10/12/2017 10:03 AM   Rosa-Wound Color Tan 10/12/2017 10:03 AM   Diabetic Wound - Mariea Bail 2 10/12/2017 10:03 AM   Wound Assessment Bleeding 10/19/2017 10:10 AM   Wound Length (cm) 0.2 cm 10/19/2017 10:10 AM   Wound Width (cm) 0.1 cm 10/19/2017 10:10 AM   Wound Depth (cm)  0.2 10/19/2017 10:10 AM   Calculated Wound Size (cm^2) (l*w) 0.02 cm^2 10/19/2017 10:10 AM   Change in Wound Size % (l*w) 98.97 10/19/2017 10:10 AM   Dressing Status Clean;Dry; Intact 10/2/2017 10:57 AM   Dressing Changed Changed/New 10/2/2017 10:57 AM   Dressing/Treatment Alginate;Dry dressing;Silver dressing 10/12/2017 10:26 AM   Wound Cleansed Rinsed/Irrigated with saline 10/19/2017 10:01 AM   Granulation Quality Pale 8/3/2017  9:52 AM   Drainage Amount Moderate 10/19/2017 10:10 AM   Drainage Description Serosanguinous 10/19/2017 10:10 AM   Odor None 10/19/2017 10:01 AM   Epithelialization Large:% 9/7/2017 10:27 AM   [place]%Wound Bed 0 10/19/2017 10:01 AM   Red%Wound Bed 0 10/19/2017 10:01 AM   Yellow%Wound Bed 100 10/19/2017 10:01 AM   Black%Wound Bed 0 10/19/2017 10:01 AM   Purple%Wound Bed 0 10/19/2017 10:01 AM   Time out Yes 10/19/2017 10:01 AM   Op First Treatment Date 03/23/17 3/23/2017 11:08 AM   Number of days: 209           Total Surface Area Debrided:  0.02 sq cm     Percentage of wound debrided 100%    Bleeding:  Minimal    Hemostasis Achieved:  by pressure    Procedural Pain:  0  / 10     Post Procedural Pain:  0 / 10     Response to treatment:  Well tolerated by patient. Plan:     The nature of the patient's condition was explained in depth. The patient was informed that their compliance to the treatment plan is paramount to successful healing and prevention of further ulceration and/or infection     Discharge Treatment     Dressing care: Stop Smoking. Fibracol,  dry dressing- change 2 days.      Written Patient Discharge Instructions Given            Electronically signed by Dany Van DO on 10/19/2017 at 10:57 AM

## 2017-10-26 ENCOUNTER — HOSPITAL ENCOUNTER (OUTPATIENT)
Dept: WOUND CARE | Age: 62
Discharge: HOME | End: 2017-10-26
Attending: FAMILY MEDICINE | Admitting: FAMILY MEDICINE

## 2017-10-26 VITALS
TEMPERATURE: 97.4 F | RESPIRATION RATE: 16 BRPM | DIASTOLIC BLOOD PRESSURE: 73 MMHG | SYSTOLIC BLOOD PRESSURE: 148 MMHG | HEART RATE: 84 BPM

## 2017-10-26 PROCEDURE — 99213 OFFICE O/P EST LOW 20 MIN: CPT | Performed by: FAMILY MEDICINE

## 2017-10-26 ASSESSMENT — PAIN DESCRIPTION - PROGRESSION: CLINICAL_PROGRESSION: NOT CHANGED

## 2017-10-26 ASSESSMENT — PAIN SCALES - GENERAL: PAINLEVEL_OUTOF10: 5

## 2017-10-26 ASSESSMENT — PAIN DESCRIPTION - DESCRIPTORS: DESCRIPTORS: ACHING

## 2017-10-26 ASSESSMENT — PAIN DESCRIPTION - LOCATION: LOCATION: FOOT

## 2017-10-26 ASSESSMENT — PAIN DESCRIPTION - PAIN TYPE: TYPE: CHRONIC PAIN;NEUROPATHIC PAIN

## 2017-10-26 ASSESSMENT — PAIN DESCRIPTION - FREQUENCY: FREQUENCY: CONTINUOUS

## 2017-10-26 ASSESSMENT — PAIN DESCRIPTION - ORIENTATION: ORIENTATION: LEFT

## 2017-10-26 ASSESSMENT — PAIN DESCRIPTION - ONSET: ONSET: ON-GOING

## 2017-10-26 NOTE — PLAN OF CARE
Problem: Wound:  Goal: Will show signs of wound healing; wound closure and no evidence of infection  Will show signs of wound healing; wound closure and no evidence of infection  Outcome: Ongoing  Discharge instructions given. Patient verbalized understanding. Return to [facility] in 1 week.   Healed    [] antibiotics    [] X-ray     [] Culture   [] Debridement      [] HBO Evaluation    [] LABS   [] Vascular Studies []

## 2017-10-26 NOTE — PROGRESS NOTES
History   Problem Relation Age of Onset    Heart Disease Mother     Diabetes Mother     Diabetes Father     Cancer Maternal Grandmother      Breast       SOCIAL HISTORY    Social History   Substance Use Topics    Smoking status: Current Some Day Smoker     Packs/day: 1.00     Years: 40.00     Types: Cigarettes     Last attempt to quit: 8/15/2016    Smokeless tobacco: Never Used    Alcohol use No       ALLERGIES    Allergies   Allergen Reactions    Lyrica [Pregabalin]      Hallucinated with this. Hallucinated with this.  Metformin Diarrhea       MEDICATIONS    Current Outpatient Prescriptions on File Prior to Encounter   Medication Sig Dispense Refill    esomeprazole Magnesium (NEXIUM) 20 MG PACK Take 20 mg by mouth daily      insulin glargine (TOUJEO SOLOSTAR) 300 UNIT/ML injection pen Inject 10 Units into the skin nightly 5 Pen 3    amLODIPine (NORVASC) 5 MG tablet Take 1 tablet by mouth daily 15 tablet 2    atorvastatin (LIPITOR) 80 MG tablet Take 1 tablet by mouth nightly 15 tablet 2    ergocalciferol (ERGOCALCIFEROL) 80963 units capsule Take 1 capsule by mouth once a week 2 capsule 2    aspirin EC 81 MG EC tablet Take 1 tablet by mouth daily 30 tablet 5    Glucose Blood (BLOOD GLUCOSE TEST STRIPS) STRP Use as directed - test three times daily 100 strip 5    Blood Glucose Monitoring Suppl (BLOOD GLUCOSE SYSTEM ALAYNA) KIT Test three times daily 1 kit 0    Lancets MISC Use for each blood sugar test 100 each 5    Alcohol Swabs (ALCOHOL WIPES) PADS Use with testing three times daily 100 each 5    insulin lispro (HUMALOG) 100 UNIT/ML pen Inject 0-10 Units into the skin 3 times daily (before meals) Less than 130 - no insulin; 130-150 = 5 units, greater than 150 = 10 units 2 Pen 2     No current facility-administered medications on file prior to encounter. REVIEW OF SYSTEMS    Pertinent items are noted in HPI.       Objective:      BP (!) 148/73   Pulse 84   Temp 97.4 °F (36.3 °C) (Oral) mellitus, with fat layer exposed ([address]    Atherosclerosis of native artery of left lower extremity with ulceration of midfoot ([facility]       Diabetic Ulcer 03/23/17 Foot Left;Lateral #1 (Active)   Rosa-wound Assessment Intact 10/26/2017 10:06 AM   Rosa-Wound Texture Scarring 10/26/2017 10:06 AM   Rosa-Wound Moisture Dry 10/26/2017 10:06 AM   Rosa-Wound Color Pale 10/26/2017 10:06 AM   Diabetic Wound - Mariea Bail 2 10/26/2017 10:06 AM   Wound Assessment Intact; Epithelialization 10/26/2017 10:06 AM   Wound Length (cm) 0.2 cm 10/19/2017 10:10 AM   Wound Width (cm) 0.1 cm 10/19/2017 10:10 AM   Wound Depth (cm)  0.2 10/19/2017 10:10 AM   Calculated Wound Size (cm^2) (l*w) 0.02 cm^2 10/19/2017 10:10 AM   Change in Wound Size % (l*w) 98.97 10/19/2017 10:10 AM   Dressing Status Clean;Dry; Intact 10/2/2017 10:57 AM   Dressing Changed Changed/New 10/2/2017 10:57 AM   Dressing/Treatment Dry dressing; Other (Comment) 10/19/2017 10:10 AM   Wound Cleansed Rinsed/Irrigated with saline 10/26/2017 10:06 AM   Granulation Quality Pale 8/3/2017  9:52 AM   Drainage Amount None 10/26/2017 10:06 AM   Drainage Description Serosanguinous 10/19/2017 10:10 AM   Odor None 10/26/2017 10:06 AM   Epithelialization Large:% 9/7/2017 10:27 AM   [place]%Wound Bed 100 10/26/2017 10:06 AM   Red%Wound Bed 0 10/26/2017 10:06 AM   Yellow%Wound Bed 0 10/26/2017 10:06 AM   Black%Wound Bed 0 10/26/2017 10:06 AM   Purple%Wound Bed 0 10/26/2017 10:06 AM   Time out Yes 10/19/2017 10:01 AM   Op First Treatment Date 03/23/17 3/23/2017 11:08 AM   Number of days: 216           Plan:       Discharge Treatment  Wound healed. RTC prn. Can continue felt as needed.          Written Patient Discharge Instructions Given            Electronically signed by Chuck Hernandes DO on 10/26/2017 at 10:43 AM

## 2018-01-24 ENCOUNTER — TELEPHONE (OUTPATIENT)
Dept: SURGERY | Age: 63
End: 2018-01-24

## 2018-01-24 NOTE — TELEPHONE ENCOUNTER
Spoke with patient's  regarding her scheduled Angiogram on 01- with Dr. Edward Ross. Patient is asked to arrive by 1:30 PM.  NPO (8) Eight Hours prior to this arrival time. May take ASA, Heart or Blood Pressure medication that morning with a small sip of water to wash them down. 1/2 dose of Insulin the night before this procedure. No vitamins or supplements on the day of the procedure. Please bring your I.D. And report in at the Cardiovascular Lab Desk at the end of the right hand hallway on the first floor. She will need someone to drive her home following this procedure. Number to [address] [place] [address] was given [phone], as patient is not currently insured.  expressed an understanding of these instructions and had no further questions at this time. Call ended.

## 2018-01-24 NOTE — TELEPHONE ENCOUNTER
Spoke with patient advised that he is not in office, spoke with patient and her  also told him that even if she has the amputation of the toe she will still need to have adequate blood flow down to the foot to help heal that as well. Patient and family agreed with the procedure.

## 2018-01-25 ENCOUNTER — HOSPITAL ENCOUNTER (OUTPATIENT)
Dept: CARDIAC CATH/INVASIVE PROCEDURES | Age: 63
Discharge: HOME | End: 2018-01-25
Attending: SURGERY | Admitting: SURGERY

## 2018-01-25 VITALS — BODY MASS INDEX: 25.27 KG/M2 | WEIGHT: 148 LBS | HEIGHT: 64 IN

## 2018-01-25 DIAGNOSIS — I70.244 ATHEROSCLEROSIS OF NATIVE ARTERY OF LEFT LOWER EXTREMITY WITH ULCERATION OF MIDFOOT (HCC): Primary | ICD-10-CM

## 2018-01-25 LAB
CALCIUM IONIZED: 1.24 MMOL/L (ref 1.12–1.32)
GFR AFRICAN AMERICAN: 50
GFR NON-AFRICAN AMERICAN: 41
GLUCOSE BLD-MCNC: 107 MG/DL (ref 70–99)
HCT VFR BLD CALC: 38.5 % (ref 36–48)
HEMOGLOBIN: 12.5 G/DL (ref 12–16)
HEMOGLOBIN: 14 GM/DL (ref 12–16)
MCH RBC QN AUTO: 29.9 PG (ref 26–34)
MCHC RBC AUTO-ENTMCNC: 32.6 G/DL (ref 31–36)
MCV RBC AUTO: 91.7 FL (ref 80–100)
PDW BLD-RTO: 13.7 % (ref 12.4–15.4)
PERFORMED ON: ABNORMAL
PLATELET # BLD: 212 K/UL (ref 135–450)
PMV BLD AUTO: 9.2 FL (ref 5–10.5)
POC CHLORIDE: 106 MMOL/L (ref 99–110)
POC CREATININE: 1.3 MG/DL (ref 0.6–1.2)
POC HEMATOCRIT: 41 % (ref 36–48)
POC POTASSIUM: 4.3 MMOL/L (ref 3.5–5.1)
POC SAMPLE TYPE: ABNORMAL
POC SODIUM: 143 MMOL/L (ref 136–145)
RBC # BLD: 4.19 M/UL (ref 4–5.2)
WBC # BLD: 11.3 K/UL (ref 4–11)

## 2018-01-25 PROCEDURE — 37184 PRIM ART M-THRMBC 1ST VSL: CPT | Performed by: SURGERY

## 2018-01-25 PROCEDURE — 75774 ARTERY X-RAY EACH VESSEL: CPT | Performed by: SURGERY

## 2018-01-25 PROCEDURE — 75710 ARTERY X-RAYS ARM/LEG: CPT | Performed by: SURGERY

## 2018-01-25 PROCEDURE — 76937 US GUIDE VASCULAR ACCESS: CPT | Performed by: SURGERY

## 2018-01-25 PROCEDURE — 37228 PR REVSC OPN/PRQ TIB/PERO W/ANGIOPLASTY UNI: CPT | Performed by: SURGERY

## 2018-01-25 PROCEDURE — 37227 PR REVSC OPN/PRQ FEM/POP W/STNT/ATHRC/ANGIOP SM VSL: CPT | Performed by: SURGERY

## 2018-01-25 RX ORDER — 0.9 % SODIUM CHLORIDE 0.9 %
500 INTRAVENOUS SOLUTION INTRAVENOUS PRN
Status: DISCONTINUED | OUTPATIENT
Start: 2018-01-25 | End: 2018-01-26 | Stop reason: HOSPADM

## 2018-01-25 RX ORDER — FENTANYL CITRATE 50 UG/ML
25 INJECTION, SOLUTION INTRAMUSCULAR; INTRAVENOUS
Status: ACTIVE | OUTPATIENT
Start: 2018-01-25 | End: 2018-01-25

## 2018-01-25 RX ORDER — ONDANSETRON 2 MG/ML
4 INJECTION INTRAMUSCULAR; INTRAVENOUS EVERY 6 HOURS PRN
Status: DISCONTINUED | OUTPATIENT
Start: 2018-01-25 | End: 2018-01-26 | Stop reason: HOSPADM

## 2018-01-25 RX ORDER — SODIUM CHLORIDE 0.9 % (FLUSH) 0.9 %
10 SYRINGE (ML) INJECTION PRN
Status: DISCONTINUED | OUTPATIENT
Start: 2018-01-25 | End: 2018-01-26 | Stop reason: HOSPADM

## 2018-01-25 RX ORDER — SODIUM CHLORIDE 0.9 % (FLUSH) 0.9 %
10 SYRINGE (ML) INJECTION EVERY 12 HOURS SCHEDULED
Status: DISCONTINUED | OUTPATIENT
Start: 2018-01-25 | End: 2018-01-26 | Stop reason: HOSPADM

## 2018-01-25 RX ORDER — SODIUM CHLORIDE 9 MG/ML
INJECTION, SOLUTION INTRAVENOUS CONTINUOUS
Status: DISCONTINUED | OUTPATIENT
Start: 2018-01-25 | End: 2018-01-26 | Stop reason: HOSPADM

## 2018-01-25 RX ORDER — ACETAMINOPHEN 325 MG/1
650 TABLET ORAL EVERY 4 HOURS PRN
Status: DISCONTINUED | OUTPATIENT
Start: 2018-01-25 | End: 2018-01-26 | Stop reason: HOSPADM

## 2018-01-25 RX ORDER — MORPHINE SULFATE 10 MG/ML
2 INJECTION, SOLUTION INTRAMUSCULAR; INTRAVENOUS
Status: ACTIVE | OUTPATIENT
Start: 2018-01-25 | End: 2018-01-25

## 2018-01-25 NOTE — PROGRESS NOTES
Pre-Peripheral Vascular Intervention      [facility] RECORD NUMBER:  [id number]  AGE: 60 y.o. GENDER: female  : 1955  TODAYS DATE:  2018    Subjective:      Tamra Dale is a 60 y.o. female will require a vascular intervention.         PAST MEDICAL HISTORY        Diagnosis Date    Allergic rhinitis due to pollen     Arthritis     Breast cancer ([address]     left     Breast cancer, left breast ([facility] 2012    chemo and radiation    Bronchitis, mucopurulent recurrent (HCC)     Cerebrovascular disease     CVA x 4, last mid 2016    COPD (chronic obstructive pulmonary disease) ([address]     Coronary artery disease     stent 2010    DDD (degenerative disc disease), lumbosacral 2012    Depression     Diabetes mellitus ([address]     Diabetes mellitus out of control ([facility]     Diabetic retinopathy ([facility] 1/3/2013    Hand pain     and feet neuroapthy from DM    Herniated lumbar disc without myelopathy     HTN (hypertension)     Hyperlipidemia LDL goal <70     Insomnia     Irritable bowel syndrome     Neuropathy (HCC)     of feet    osteoporosis     Stroke (cerebrum) ([address] Dec 11 2015    TIA 2012    Tobacco abuse     current       PAST SURGICAL HISTORY    Past Surgical History:   Procedure Laterality Date    BREAST SURGERY  12    left breast segmental mastectomy    COLONOSCOPY      CORONARY ANGIOPLASTY WITH STENT PLACEMENT          CORONARY ANGIOPLASTY WITH STENT PLACEMENT  2014    INSERTABLE CARDIAC MONITOR      TONSILLECTOMY      TUBAL LIGATION      TUNNELED VENOUS PORT PLACEMENT  12       FAMILY HISTORY    Family History   Problem Relation Age of Onset    Heart Disease Mother     Diabetes Mother     Diabetes Father     Cancer Maternal Grandmother      Breast       SOCIAL HISTORY    Social History   Substance Use Topics    Smoking status: Current Some Day Smoker     Packs/day: 1.00     Years: 40.00     Types: Cigarettes Patient Active Problem List   Diagnosis    Neuropathy ([address]    Mixed hyperlipidemia    Uncontrolled type 2 diabetes mellitus with hyperglycemia, with long-term current use of insulin ([address]    Coronary artery disease    Smoking    Breast cancer, left breast ([address]    DDD (degenerative disc disease), lumbosacral    Diabetic peripheral neuropathy (HCC)    Diabetic retinopathy ([address]    COPD (chronic obstructive pulmonary disease) ([address]    Arthritis    Ataxia    GERD (gastroesophageal reflux disease)    Diabetic polyneuropathy (HCC)    Dermatophytosis of nail    Precordial pain    Weakness of right leg    Cerebrovascular accident (CVA) due to occlusion of cerebral artery ([address]    Essential hypertension    Severe mitral regurgitation    Coronary artery disease involving native coronary artery of native heart without angina pectoris    Acute CVA (cerebrovascular accident) ([address]    Generalized osteoarthritis    Vitamin D deficiency    Dysphagia due to recent stroke    Dysarthria    [facility]ox[facility] fibrillation ([facility]    Hypokalemia    Encounter for electronic analysis of reveal event recorder    Arterial ischemic stroke, ICA, left, acute ([address]    DM (diabetes mellitus), secondary, uncontrolled, w/neurologic complic ([address]    HTN (hypertension), benign    Paresis ([address]    Diabetic ulcer of left foot associated with type 2 diabetes mellitus ([address]    Decreased pulses in feet    Chronic idiopathic constipation    Diabetic ulcer of toe of left foot associated with type 2 diabetes mellitus ([address]    Atherosclerosis of native artery of left lower extremity with ulceration of midfoot ([address]       Arterial Exam Lower Extremity:  pedal pulses decreased or absent bilateral    Location Target Vessel Side: left    Vascular Exam of Planned Intervention: CLI - Minor Tissue Loss ([address]), Pt reported max distance: <200 meters (2 blocks), CLI Rest Pain: Yes, CLI Tissue Loss Location:

## 2018-01-25 NOTE — H&P
Family History   Problem Relation Age of Onset    Heart Disease Mother      Diabetes Mother      Diabetes Father      Cancer Maternal Grandmother         Breast            SOCIAL HISTORY           Social History   Substance Use Topics    Smoking status: Current Some Day Smoker       Packs/day: 1.00       Years: 40.00       Types: Cigarettes       Last attempt to quit: 8/15/2016    Smokeless tobacco: Never Used    Alcohol use No         ALLERGIES           Allergies   Allergen Reactions    Lyrica [Pregabalin]         Hallucinated with this. Hallucinated with this.     Metformin Diarrhea         MEDICATIONS            Current Outpatient Prescriptions on File Prior to Encounter   Medication Sig Dispense Refill    esomeprazole Magnesium (NEXIUM) 20 MG PACK Take 20 mg by mouth daily        insulin glargine (TOUJEO SOLOSTAR) 300 UNIT/ML injection pen Inject 10 Units into the skin nightly 5 Pen 3    amLODIPine (NORVASC) 5 MG tablet Take 1 tablet by mouth daily 15 tablet 2    atorvastatin (LIPITOR) 80 MG tablet Take 1 tablet by mouth nightly 15 tablet 2    ergocalciferol (ERGOCALCIFEROL) 01934 units capsule Take 1 capsule by mouth once a week 2 capsule 2    aspirin EC 81 MG EC tablet Take 1 tablet by mouth daily 30 tablet 5    Glucose Blood (BLOOD GLUCOSE TEST STRIPS) STRP Use as directed - test three times daily 100 strip 5    Blood Glucose Monitoring Suppl (BLOOD GLUCOSE SYSTEM ALAYNA) KIT Test three times daily 1 kit 0    Lancets MISC Use for each blood sugar test 100 each 5    Alcohol Swabs (ALCOHOL WIPES) PADS Use with testing three times daily 100 each 5    insulin lispro (HUMALOG) 100 UNIT/ML pen Inject 0-10 Units into the skin 3 times daily (before meals) Less than 130 - no insulin; 130-150 = 5 units, greater than 150 = 10 units 2 Pen 2      No current facility-administered medications on file prior to encounter.          REVIEW OF SYSTEMS     Pertinent items are noted in HPI.        Objective:     BP (!) 148/73   Pulse 84   Temp 97.4 °F (36.3 °C) (Oral)   Resp 16      PHYSICAL EXAM     General Appearance: alert and oriented to person, place and time, well-developed and well-nourished, in no acute distress  Skin: warm and dry  Head: normocephalic and atraumatic  Eyes: extraocular eye movements intact and conjunctivae normal  Extremities: no cyanosis and no clubbing  Musculoskeletal: normal range of motion, no joint swelling, deformity or tenderness        Assessment:          Patient Active Problem List   Diagnosis    Neuropathy ([address]    Mixed hyperlipidemia    Uncontrolled type 2 diabetes mellitus with hyperglycemia, with long-term current use of insulin ([facility])    Coronary artery disease    Smoking    Breast cancer, left breast ([address]    DDD (degenerative disc disease), lumbosacral    Diabetic peripheral neuropathy ([facility])    Diabetic retinopathy ([address]    COPD (chronic obstructive pulmonary disease) ([facility])    Arthritis    Ataxia    GERD (gastroesophageal reflux disease)    Diabetic polyneuropathy ([facility])    Dermatophytosis of nail    Precordial pain    Weakness of right leg    Cerebrovascular accident (CVA) due to occlusion of cerebral artery ([address]    Essential hypertension    Severe mitral regurgitation    Coronary artery disease involving native coronary artery of native heart without angina pectoris    Acute CVA (cerebrovascular accident) ([address]    Generalized osteoarthritis    Vitamin D deficiency    Dysphagia due to recent stroke    Dysarthria    Paroxysmal atrial fibrillation ([facility])    Hypokalemia    Encounter for electronic analysis of reveal event recorder    Arterial ischemic stroke, ICA, left, acute ([address]    DM (diabetes mellitus), secondary, uncontrolled, w/neurologic complic ([address]    HTN (hypertension), benign    Paresis ([address]    Diabetic ulcer of left foot associated with type 2 diabetes mellitus (HCC)    Decreased pulses in feet    Chronic idiopathic constipation    Diabetic ulcer of toe of left foot associated with type 2 diabetes mellitus, with fat layer exposed ([address]    Atherosclerosis of native artery of left lower extremity with ulceration of midfoot ([address]              Diabetic Ulcer 03/23/17 Foot Left;Lateral #1 (Active)   Rosa-wound Assessment Intact 10/26/2017 10:06 AM   Rosa-Wound Texture Scarring 10/26/2017 10:06 AM   Rosa-Wound Moisture Dry 10/26/2017 10:06 AM   Rosa-Wound Color Pale 10/26/2017 10:06 AM   Diabetic Wound - Lenord Shorten 2 10/26/2017 10:06 AM   Wound Assessment Intact; Epithelialization 10/26/2017 10:06 AM   Wound Length (cm) 0.2 cm 10/19/2017 10:10 AM   Wound Width (cm) 0.1 cm 10/19/2017 10:10 AM   Wound Depth (cm)  0.2 10/19/2017 10:10 AM   Calculated Wound Size (cm^2) (l*w) 0.02 cm^2 10/19/2017 10:10 AM   Change in Wound Size % (l*w) 98.97 10/19/2017 10:10 AM   Dressing Status Clean;Dry; Intact 10/2/2017 10:57 AM   Dressing Changed Changed/New 10/2/2017 10:57 AM   Dressing/Treatment Dry dressing; Other (Comment) 10/19/2017 10:10 AM   Wound Cleansed Rinsed/Irrigated with saline 10/26/2017 10:06 AM   Granulation Quality Pale 8/3/2017  9:52 AM   Drainage Amount None 10/26/2017 10:06 AM   Drainage Description Serosanguinous 10/19/2017 10:10 AM   Odor None 10/26/2017 10:06 AM   Epithelialization Large:% 9/7/2017 10:27 AM   [place]%Wound Bed 100 10/26/2017 10:06 AM   Red%Wound Bed 0 10/26/2017 10:06 AM   Yellow%Wound Bed 0 10/26/2017 10:06 AM   Black%Wound Bed 0 10/26/2017 10:06 AM   Purple%Wound Bed 0 10/26/2017 10:06 AM   Time out Yes 10/19/2017 10:01 AM   Op First Treatment Date 03/23/17 3/23/2017 11:08 AM   Number of days: 216               Plan:     In order to promote wound healing and provide long-term limb salvage, recommend proceeding with diagnostic angiography with possible endovascular revascularization.   The Patient will be scheduled for an Out-patient angiogram of the aorta and bilateral lower extremity w/possible

## 2018-01-25 NOTE — PRE SEDATION
Sedation Pre-Procedure Note    Patient Name: Radha Silveira   YOB: 1955  Room/Bed: Room/bed info not found  Medical Record Number: [id number]  Date: 1/25/2018   Time: 2:15 PM       Indication:  PAD    Consent: I have discussed with the patient and/or the patient representative the indication, alternatives, and the possible risks and/or complications of the planned procedure and the anesthesia methods. The patient and/or patient representative appear to understand and agree to proceed. Vital Signs: There were no vitals filed for this visit. Past Medical History:   has a past medical history of Allergic rhinitis due to pollen; Arthritis; Breast cancer ([address] Breast cancer, left breast ([address] Bronchitis, mucopurulent recurrent ([address] Cerebrovascular disease; COPD (chronic obstructive pulmonary disease) ([address] Coronary artery disease; DDD (degenerative disc disease), lumbosacral; Depression; Diabetes mellitus ([address] Diabetes mellitus out of control ([address] Diabetic retinopathy ([address] Hand pain; Herniated lumbar disc without myelopathy; HTN (hypertension); Hyperlipidemia LDL goal <70; Insomnia; Irritable bowel syndrome; Neuropathy ([address] osteoporosis; Stroke (cerebrum) ([address] TIA; and Tobacco abuse. Past Surgical History:   has a past surgical history that includes Tonsillectomy; Tubal ligation; Coronary angioplasty with stent; Breast surgery (03/12/12); Colonoscopy; Tunneled venous port placement (6/6/12); Coronary angioplasty with stent (12/2014); and Insertable Cardiac Monitor. Medications:   Scheduled Meds:    sodium chloride flush  10 mL Intravenous 2 times per day     Continuous Infusions:    sodium chloride       PRN Meds: sodium chloride flush  Home Meds:   Prior to Admission medications    Medication Sig Start Date End Date Taking?  Authorizing Provider   esomeprazole Magnesium (NEXIUM) 20 MG PACK Take 20 mg by mouth daily   Yes Historical Provider, MD   insulin glargine (TOUJEO

## 2018-01-26 NOTE — PROCEDURES
[address] [place] [address] 16                                  PROCEDURE NOTE    PATIENT NAME: Jacklyn Jeronimo                  :        1955  MED REC NO:   [id number]                          ROOM:  ACCOUNT NO:   [de-identified]                          ADMIT DATE: 2018  PROVIDER:     Pato Alvarado MD    ANGIOGRAPHY NOTE    DATE OF PROCEDURE:  2018    PREOPERATIVE DIAGNOSES:  1. Lower extremity atherosclerosis with ulceration of the left foot. 2.  Peripheral arterial disease. POSTOPERATIVE DIAGNOSES:  1. Lower extremity atherosclerosis with ulceration of the left foot. 2.  Peripheral arterial disease. PROCEDURE PERFORMED:  1. Ultrasound-guided access to the right common femoral artery. 2.  Angiogram of left lower extremity. 3.  Selective angiography of left popliteal and tibial arteries. 4.  Laser atherectomy with drug-coated stent placement, left superficial  femoral artery. 5.  Angioplasty of left anterior tibial artery. 6.  Primary percutaneous mechanical arterial thrombectomy, left superficial  femoral artery. SURGEON:  Pato Alvarado MD    ANESTHESIA:  Local with IV sedation. INDICATIONS:  The patient is a 79-year-old female with nonhealing  ulceration of the left foot. Angiography is indicated to determine if she  is a candidate for revascularization. Endovascular revascularization is  indicated to improve perfusion to the left foot and provide wound healing. She was aware of the risks, benefits, and alternatives of the procedure and  willing to proceed. PROCEDURE:  Using ultrasound guidance, percutaneous access was gained to  the right common femoral artery with a long 7-German sheath. The 7-German  sheath was placed up and over the aortic bifurcation and into the left  external iliac artery. A full and complete angiogram of the left lower  extremity was obtained.   The [employer]  technique. Good hemostasis was obtained, and the patient tolerated the  procedure well. The patient had a 2+ palpable left dorsalis pedis pulse at  the completion of the procedure. FINDINGS:  Left Lower Extremity:  The left common, internal, and external  iliac arteries are normal.  The common and deep femoral arteries are  normal.  The superficial femoral artery is occluded at its origin. There  is reconstitution of the above-knee popliteal artery. There is one-vessel  runoff to the foot via the anterior tibial artery. There is a 70% stenosis  in the proximal 10 cm of the anterior tibial artery. The peroneal and  posterior tibial arteries are occluded throughout their length. IMPRESSION:  Good angiographic result postendovascular revascularization of  the left lower extremity with return of pulsatile flow into the left foot  at the completion of the procedure. Note, the patient is clear from a vascular standpoint to proceed with  Podiatry intervention in the left foot as needed. Probability for wound  healing based on angiographic results is good.         Libra Izaguirre MD    D: 01/25/2018 15:39:33       T: 01/25/2018 15:43:12     [email or web address]  Job#: [id number]     Doc#: [id number]    CC:

## 2018-02-05 ENCOUNTER — HOSPITAL ENCOUNTER (OUTPATIENT)
Dept: WOUND CARE | Age: 63
Discharge: HOME | End: 2018-02-05
Attending: SURGERY | Admitting: SURGERY

## 2018-02-05 DIAGNOSIS — I25.10 ATHEROSCLEROTIC HEART DISEASE OF NATIVE CORONARY ARTERY WITHOUT ANGINA PECTORIS: ICD-10-CM

## 2018-02-19 ENCOUNTER — HOSPITAL ENCOUNTER (OUTPATIENT)
Dept: WOUND CARE | Age: 63
Discharge: HOME | End: 2018-02-19
Attending: SURGERY | Admitting: SURGERY

## 2018-02-19 ENCOUNTER — HOSPITAL ENCOUNTER (OUTPATIENT)
Dept: OTHER | Age: 63
Discharge: HOME | End: 2018-02-19
Attending: SURGERY | Admitting: SURGERY

## 2018-02-19 VITALS
TEMPERATURE: 98.1 F | SYSTOLIC BLOOD PRESSURE: 129 MMHG | DIASTOLIC BLOOD PRESSURE: 70 MMHG | RESPIRATION RATE: 16 BRPM | HEART RATE: 72 BPM

## 2018-02-19 DIAGNOSIS — E11.621 DIABETIC ULCER OF TOE OF LEFT FOOT ASSOCIATED WITH TYPE 2 DIABETES MELLITUS, LIMITED TO BREAKDOWN OF SKIN (HCC): ICD-10-CM

## 2018-02-19 DIAGNOSIS — I70.244 ATHEROSCLEROSIS OF NATIVE ARTERY OF LEFT LOWER EXTREMITY WITH ULCERATION OF MIDFOOT (HCC): Primary | ICD-10-CM

## 2018-02-19 DIAGNOSIS — L97.521 DIABETIC ULCER OF TOE OF LEFT FOOT ASSOCIATED WITH TYPE 2 DIABETES MELLITUS, LIMITED TO BREAKDOWN OF SKIN (HCC): ICD-10-CM

## 2018-02-19 PROCEDURE — 99213 OFFICE O/P EST LOW 20 MIN: CPT | Performed by: SURGERY

## 2018-02-19 RX ORDER — LIDOCAINE HYDROCHLORIDE 40 MG/ML
SOLUTION TOPICAL ONCE
Status: DISCONTINUED | OUTPATIENT
Start: 2018-02-19 | End: 2018-02-20 | Stop reason: HOSPADM

## 2018-02-19 ASSESSMENT — PAIN DESCRIPTION - LOCATION: LOCATION: FOOT

## 2018-02-19 ASSESSMENT — PAIN SCALES - GENERAL: PAINLEVEL_OUTOF10: 8

## 2018-02-19 ASSESSMENT — PAIN DESCRIPTION - PAIN TYPE: TYPE: CHRONIC PAIN

## 2018-02-19 ASSESSMENT — PAIN DESCRIPTION - ORIENTATION: ORIENTATION: LEFT

## 2018-02-19 NOTE — PROGRESS NOTES
[facility]  Progress Note       Queta Lowe  AGE: 60 y.o. GENDER: female  : 1955  TODAY'S DATE:  2018    Subjective:     Chief Complaint   Patient presents with    Wound Check     Left foot, c/o swelling, pain         HISTORY of PRESENT ILLNESS HPI     Queta Lowe is a 60 y.o. female who presents today for wound evaluation.    History of Wound: History of left second toe ulcer now healed    Wound Pain:  mild  Severity:  2 / 10   Wound Type:  arterial  Modifying Factors:  arterial insufficiency  Associated Signs/Symptoms:  none        PAST MEDICAL HISTORY        Diagnosis Date    Allergic rhinitis due to pollen     Arthritis     Breast cancer ([address]     left     Breast cancer, left breast ([address] 2012    chemo and radiation    Bronchitis, mucopurulent recurrent ([address]     Cerebrovascular disease     CVA x 4, last mid 2016    COPD (chronic obstructive pulmonary disease) ([address]     Coronary artery disease     stent 2010    DDD (degenerative disc disease), lumbosacral 2012    Depression     Diabetes mellitus ([address]     Diabetes mellitus out of control ([address] 2004    Diabetic retinopathy ([address] 1/3/2013    Hand pain     and feet neuroapthy from DM    Herniated lumbar disc without myelopathy     HTN (hypertension)     Hyperlipidemia LDL goal <70     Insomnia     Irritable bowel syndrome     Neuropathy ([address]     of feet    osteoporosis     Stroke (cerebrum) ([address] Dec 11 2015    TIA 2012    Tobacco abuse     current       PAST SURGICAL HISTORY    Past Surgical History:   Procedure Laterality Date    BREAST SURGERY  12    left breast segmental mastectomy    COLONOSCOPY      CORONARY ANGIOPLASTY WITH STENT PLACEMENT          CORONARY ANGIOPLASTY WITH STENT PLACEMENT  2014    INSERTABLE CARDIAC MONITOR      TONSILLECTOMY      TUBAL LIGATION      TUNNELED VENOUS PORT PLACEMENT  12       FAMILY HISTORY    Family History   Problem Relation Age of Onset    Heart Disease Mother     Diabetes Mother     Diabetes Father     Cancer Maternal Grandmother      Breast       SOCIAL HISTORY    Social History   Substance Use Topics    Smoking status: Current Some Day Smoker     Packs/day: 1.00     Years: 40.00     Types: Cigarettes     Last attempt to quit: 8/15/2016    Smokeless tobacco: Never Used    Alcohol use No       ALLERGIES    Allergies   Allergen Reactions    Lyrica [Pregabalin]      Hallucinated with this. Hallucinated with this.  Metformin Diarrhea       MEDICATIONS    Current Outpatient Prescriptions on File Prior to Encounter   Medication Sig Dispense Refill    esomeprazole Magnesium (NEXIUM) 20 MG PACK Take 20 mg by mouth daily      insulin glargine (TOUJEO SOLOSTAR) 300 UNIT/ML injection pen Inject 10 Units into the skin nightly 5 Pen 3    amLODIPine (NORVASC) 5 MG tablet Take 1 tablet by mouth daily 15 tablet 2    atorvastatin (LIPITOR) 80 MG tablet Take 1 tablet by mouth nightly 15 tablet 2    ergocalciferol (ERGOCALCIFEROL) 36574 units capsule Take 1 capsule by mouth once a week 2 capsule 2    aspirin EC 81 MG EC tablet Take 1 tablet by mouth daily 30 tablet 5    insulin lispro (HUMALOG) 100 UNIT/ML pen Inject 0-10 Units into the skin 3 times daily (before meals) Less than 130 - no insulin; 130-150 = 5 units, greater than 150 = 10 units 2 Pen 2    Glucose Blood (BLOOD GLUCOSE TEST STRIPS) STRP Use as directed - test three times daily 100 strip 5    Blood Glucose Monitoring Suppl (BLOOD GLUCOSE SYSTEM ALAYNA) KIT Test three times daily 1 kit 0    Lancets MISC Use for each blood sugar test 100 each 5    Alcohol Swabs (ALCOHOL WIPES) PADS Use with testing three times daily 100 each 5     No current facility-administered medications on file prior to encounter. REVIEW OF SYSTEMS    A comprehensive review of systems was negative.       Objective:      /70   Pulse 72   Temp 98.1 °F (36.7 °C) (Oral)   Resp 16     PHYSICAL EXAM    General Appearance: alert and oriented to person, place and time, well developed and well- nourished, in no acute distress  Skin: warm and dry, no rash or erythema  Head: normocephalic and atraumatic  Eyes: pupils equal, round, and reactive to light, extraocular eye movements intact, conjunctivae normal  ENT:  external ear  bilaterally, nose without deformity  Neck: supple and non-tender without mass, no thyromegaly   Pulmonary/Chest:  normal air movement, no respiratory distress  Cardiovascular: normal rate, regular rhythm,  2+ left pedal pulses  Abdomen: soft, non-tender, non-distended  Extremities: no cyanosis, clubbing or edema  Musculoskeletal: normal range of motion, no joint swelling, deformity or tenderness  Neurologic: reflexes normal and symmetric, no cranial nerve deficit, gait, coordination and speech normal      Assessment:     Patient Active Problem List   Diagnosis    Neuropathy ([address]    Mixed hyperlipidemia    Uncontrolled type 2 diabetes mellitus with hyperglycemia, with long-term current use of insulin (HCC)    Coronary artery disease    Smoking    Breast cancer, left breast ([address]    DDD (degenerative disc disease), lumbosacral    Diabetic peripheral neuropathy (HCC)    Diabetic retinopathy ([address]    COPD (chronic obstructive pulmonary disease) ([address]    Arthritis    Ataxia    GERD (gastroesophageal reflux disease)    Diabetic polyneuropathy (HCC)    Dermatophytosis of nail    Precordial pain    Weakness of right leg    Cerebrovascular accident (CVA) due to occlusion of cerebral artery ([address]    Essential hypertension    Severe mitral regurgitation    Coronary artery disease involving native coronary artery of native heart without angina pectoris    Acute CVA (cerebrovascular accident) ([address]    Generalized osteoarthritis    Vitamin D deficiency    Dysphagia due to recent stroke    Dysarthria    Paroxysmal atrial fibrillation ([address]    Hypokalemia    Encounter for electronic analysis of reveal event recorder    Arterial ischemic stroke, ICA, left, acute [facility]    DM (diabetes mellitus), secondary, uncontrolled, w/neurologic complic ([address]    HTN (hypertension), benign    Paresis ([address]    Diabetic ulcer of left foot associated with type 2 diabetes mellitus ([address]    Decreased pulses in feet    Chronic idiopathic constipation    Diabetic ulcer of toe of left foot associated with type 2 diabetes mellitus ([address]    Atherosclerosis of native artery of left lower extremity with ulceration of midfoot ([address]       Diabetic Ulcer 03/23/17 Foot Left;Lateral #1 (Active)   Rosa-wound Assessment Intact 10/26/2017 10:06 AM   Rosa-Wound Texture Scarring 10/26/2017 10:06 AM   Rosa-Wound Moisture Dry 10/26/2017 10:06 AM   Rosa-Wound Color Pale 10/26/2017 10:06 AM   Diabetic Wound - Cindy Castilloo 2 10/26/2017 10:06 AM   Wound Assessment Intact; Epithelialization 10/26/2017 10:06 AM   Wound Length (cm) 0.2 cm 10/19/2017 10:10 AM   Wound Width (cm) 0.1 cm 10/19/2017 10:10 AM   Wound Depth (cm)  0.2 10/19/2017 10:10 AM   Calculated Wound Size (cm^2) (l*w) 0.02 cm^2 10/19/2017 10:10 AM   Change in Wound Size % (l*w) 98.97 10/19/2017 10:10 AM   Dressing Status Clean;Dry; Intact 10/2/2017 10:57 AM   Dressing Changed Changed/New 10/2/2017 10:57 AM   Dressing/Treatment Dry dressing; Other (Comment) 10/19/2017 10:10 AM   Wound Cleansed Rinsed/Irrigated with saline 10/26/2017 10:06 AM   Granulation Quality Pale 8/3/2017  9:52 AM   Drainage Amount None 10/26/2017 10:06 AM   Drainage Description Serosanguinous 10/19/2017 10:10 AM   Odor None 10/26/2017 10:06 AM   Epithelialization Large:% 9/7/2017 10:27 AM   Ina%Wound Bed 100 10/26/2017 10:06 AM   Red%Wound Bed 0 10/26/2017 10:06 AM   Yellow%Wound Bed 0 10/26/2017 10:06 AM   Black%Wound Bed 0 10/26/2017 10:06 AM   Purple%Wound Bed 0 10/26/2017 10:06 AM   Time out Yes 10/19/2017 10:01 AM   Op First Treatment Date

## 2018-02-26 ENCOUNTER — HOSPITAL ENCOUNTER (OUTPATIENT)
Dept: WOUND CARE | Age: 63
Discharge: HOME | End: 2018-02-26
Attending: SURGERY | Admitting: SURGERY

## 2018-02-26 VITALS
DIASTOLIC BLOOD PRESSURE: 71 MMHG | HEART RATE: 72 BPM | TEMPERATURE: 96.9 F | SYSTOLIC BLOOD PRESSURE: 103 MMHG | RESPIRATION RATE: 16 BRPM

## 2018-02-26 DIAGNOSIS — I70.244 ATHEROSCLEROSIS OF NATIVE ARTERY OF LEFT LOWER EXTREMITY WITH ULCERATION OF MIDFOOT (HCC): Primary | ICD-10-CM

## 2018-02-26 PROCEDURE — 99213 OFFICE O/P EST LOW 20 MIN: CPT | Performed by: SURGERY

## 2018-02-26 RX ORDER — LIDOCAINE HYDROCHLORIDE 40 MG/ML
SOLUTION TOPICAL ONCE
Status: DISCONTINUED | OUTPATIENT
Start: 2018-02-26 | End: 2018-02-27 | Stop reason: HOSPADM

## 2018-02-26 ASSESSMENT — PAIN DESCRIPTION - LOCATION: LOCATION: FOOT

## 2018-02-26 ASSESSMENT — PAIN SCALES - GENERAL: PAINLEVEL_OUTOF10: 8

## 2018-02-26 ASSESSMENT — PAIN DESCRIPTION - PAIN TYPE: TYPE: CHRONIC PAIN

## 2018-02-26 ASSESSMENT — PAIN DESCRIPTION - ORIENTATION: ORIENTATION: LEFT

## 2018-02-26 NOTE — PROGRESS NOTES
[facility]  Progress Note       Dany Cavanaugh  AGE: 60 y.o. GENDER: female  : 1955  TODAY'S DATE:  2018    Subjective:     Chief Complaint   Patient presents with    Wound Check     Left foot          HISTORY of PRESENT ILLNESS HPI     Dany Cavanaugh is a 60 y.o. female who presents today for wound evaluation.    History of Wound: Dry gangrene left second toe    Wound Pain:  none  Severity:  0 / 10   Wound Type:  arterial  Modifying Factors:  arterial insufficiency  Associated Signs/Symptoms:  none        PAST MEDICAL HISTORY        Diagnosis Date    Allergic rhinitis due to pollen     Arthritis     Breast cancer ([address]     left     Breast cancer, left breast ([address] 2012    chemo and radiation    Bronchitis, mucopurulent recurrent ([address]     Cerebrovascular disease     CVA x 4, last mid 2016    COPD (chronic obstructive pulmonary disease) ([address]     Coronary artery disease     stent 2010    DDD (degenerative disc disease), lumbosacral 2012    Depression     Diabetes mellitus ([address]     Diabetes mellitus out of control ([address] 2004    Diabetic retinopathy ([address] 1/3/2013    Hand pain     and feet neuroapthy from DM    Herniated lumbar disc without myelopathy     HTN (hypertension)     Hyperlipidemia LDL goal <70     Insomnia     Irritable bowel syndrome     Neuropathy ([address]     of feet    osteoporosis     Stroke (cerebrum) ([address] Dec 11 2015    TIA 2012    Tobacco abuse     current       PAST SURGICAL HISTORY    Past Surgical History:   Procedure Laterality Date    BREAST SURGERY  12    left breast segmental mastectomy    COLONOSCOPY      CORONARY ANGIOPLASTY WITH STENT PLACEMENT          CORONARY ANGIOPLASTY WITH STENT PLACEMENT  2014    INSERTABLE CARDIAC MONITOR      TONSILLECTOMY      TUBAL LIGATION      TUNNELED VENOUS PORT PLACEMENT  12       FAMILY HISTORY    Family History   Problem Relation Age of

## 2018-10-09 ENCOUNTER — HOSPITAL ENCOUNTER (INPATIENT)
Age: 63
LOS: 2 days | Discharge: HOME | DRG: 066 | End: 2018-10-12
Attending: EMERGENCY MEDICINE | Admitting: HOSPITALIST
Payer: MEDICARE

## 2018-10-09 ENCOUNTER — APPOINTMENT (OUTPATIENT)
Dept: GENERAL RADIOLOGY | Age: 63
DRG: 066 | End: 2018-10-09
Payer: MEDICARE

## 2018-10-09 ENCOUNTER — APPOINTMENT (OUTPATIENT)
Dept: CT IMAGING | Age: 63
DRG: 066 | End: 2018-10-09
Payer: MEDICARE

## 2018-10-09 DIAGNOSIS — I63.9 CEREBROVASCULAR ACCIDENT (CVA), UNSPECIFIED MECHANISM (HCC): Primary | ICD-10-CM

## 2018-10-09 LAB
A/G RATIO: 0.8 (ref 1.1–2.2)
ALBUMIN SERPL-MCNC: 3.3 G/DL (ref 3.4–5)
ALP BLD-CCNC: 90 U/L (ref 40–129)
ALT SERPL-CCNC: 11 U/L (ref 10–40)
ANION GAP SERPL CALCULATED.3IONS-SCNC: 12 MMOL/L (ref 3–16)
APTT: 34 SEC (ref 26–36)
AST SERPL-CCNC: 19 U/L (ref 15–37)
BACTERIA: ABNORMAL /HPF
BASOPHILS ABSOLUTE: 0.1 K/UL (ref 0–0.2)
BASOPHILS RELATIVE PERCENT: 1.1 %
BILIRUB SERPL-MCNC: <0.2 MG/DL (ref 0–1)
BILIRUBIN URINE: NEGATIVE
BLOOD, URINE: ABNORMAL
BUN BLDV-MCNC: 21 MG/DL (ref 7–20)
CALCIUM SERPL-MCNC: 9.2 MG/DL (ref 8.3–10.6)
CHLORIDE BLD-SCNC: 102 MMOL/L (ref 99–110)
CLARITY: ABNORMAL
CO2: 23 MMOL/L (ref 21–32)
COLOR: YELLOW
CREAT SERPL-MCNC: 1.1 MG/DL (ref 0.6–1.2)
CRYSTALS, UA: ABNORMAL /HPF
EOSINOPHILS ABSOLUTE: 0.1 K/UL (ref 0–0.6)
EOSINOPHILS RELATIVE PERCENT: 1.6 %
EPITHELIAL CELLS, UA: 4 /HPF (ref 0–5)
GFR AFRICAN AMERICAN: >60
GFR NON-AFRICAN AMERICAN: 50
GLOBULIN: 4 G/DL
GLUCOSE BLD-MCNC: 176 MG/DL
GLUCOSE BLD-MCNC: 176 MG/DL (ref 70–99)
GLUCOSE BLD-MCNC: 193 MG/DL (ref 70–99)
GLUCOSE URINE: NEGATIVE MG/DL
HCT VFR BLD CALC: 35.5 % (ref 36–48)
HEMOGLOBIN: 11.7 G/DL (ref 12–16)
HYALINE CASTS: 5 /LPF (ref 0–8)
INR BLD: 0.95 (ref 0.86–1.14)
KETONES, URINE: NEGATIVE MG/DL
LEUKOCYTE ESTERASE, URINE: NEGATIVE
LYMPHOCYTES ABSOLUTE: 1.5 K/UL (ref 1–5.1)
LYMPHOCYTES RELATIVE PERCENT: 17.4 %
MCH RBC QN AUTO: 30 PG (ref 26–34)
MCHC RBC AUTO-ENTMCNC: 32.8 G/DL (ref 31–36)
MCV RBC AUTO: 91.4 FL (ref 80–100)
MICROSCOPIC EXAMINATION: YES
MONOCYTES ABSOLUTE: 0.5 K/UL (ref 0–1.3)
MONOCYTES RELATIVE PERCENT: 6.1 %
NEUTROPHILS ABSOLUTE: 6.2 K/UL (ref 1.7–7.7)
NEUTROPHILS RELATIVE PERCENT: 73.8 %
NITRITE, URINE: NEGATIVE
PDW BLD-RTO: 14 % (ref 12.4–15.4)
PERFORMED ON: ABNORMAL
PH UA: 5.5
PLATELET # BLD: 193 K/UL (ref 135–450)
PMV BLD AUTO: 9.3 FL (ref 5–10.5)
POTASSIUM SERPL-SCNC: 3.8 MMOL/L (ref 3.5–5.1)
PRO-BNP: 119 PG/ML (ref 0–124)
PROTEIN UA: >=300 MG/DL
PROTHROMBIN TIME: 10.8 SEC (ref 9.8–13)
RBC # BLD: 3.89 M/UL (ref 4–5.2)
RBC UA: ABNORMAL /HPF (ref 0–2)
SODIUM BLD-SCNC: 137 MMOL/L (ref 136–145)
SPECIFIC GRAVITY UA: 1.02
TOTAL PROTEIN: 7.3 G/DL (ref 6.4–8.2)
TROPONIN: <0.01 NG/ML
URINE REFLEX TO CULTURE: ABNORMAL
URINE TYPE: ABNORMAL
UROBILINOGEN, URINE: 1 E.U./DL
WBC # BLD: 8.3 K/UL (ref 4–11)
WBC UA: 2 /HPF (ref 0–5)

## 2018-10-09 PROCEDURE — 85025 COMPLETE CBC W/AUTO DIFF WBC: CPT

## 2018-10-09 PROCEDURE — 36415 COLL VENOUS BLD VENIPUNCTURE: CPT

## 2018-10-09 PROCEDURE — 85610 PROTHROMBIN TIME: CPT

## 2018-10-09 PROCEDURE — 83880 ASSAY OF NATRIURETIC PEPTIDE: CPT

## 2018-10-09 PROCEDURE — 93005 ELECTROCARDIOGRAM TRACING: CPT | Performed by: PHYSICIAN ASSISTANT

## 2018-10-09 PROCEDURE — 84484 ASSAY OF TROPONIN QUANT: CPT

## 2018-10-09 PROCEDURE — 80061 LIPID PANEL: CPT

## 2018-10-09 PROCEDURE — 71045 X-RAY EXAM CHEST 1 VIEW: CPT

## 2018-10-09 PROCEDURE — 80053 COMPREHEN METABOLIC PANEL: CPT

## 2018-10-09 PROCEDURE — 6370000000 HC RX 637 (ALT 250 FOR IP): Performed by: EMERGENCY MEDICINE

## 2018-10-09 PROCEDURE — 99285 EMERGENCY DEPT VISIT HI MDM: CPT

## 2018-10-09 PROCEDURE — 85730 THROMBOPLASTIN TIME PARTIAL: CPT

## 2018-10-09 PROCEDURE — 81001 URINALYSIS AUTO W/SCOPE: CPT

## 2018-10-09 PROCEDURE — 70450 CT HEAD/BRAIN W/O DYE: CPT

## 2018-10-09 RX ORDER — SODIUM CHLORIDE 0.9 % (FLUSH) 0.9 %
10 SYRINGE (ML) INJECTION EVERY 12 HOURS SCHEDULED
Status: DISCONTINUED | OUTPATIENT
Start: 2018-10-09 | End: 2018-10-12 | Stop reason: HOSPADM

## 2018-10-09 RX ORDER — ONDANSETRON 2 MG/ML
4 INJECTION INTRAMUSCULAR; INTRAVENOUS EVERY 6 HOURS PRN
Status: DISCONTINUED | OUTPATIENT
Start: 2018-10-09 | End: 2018-10-12 | Stop reason: HOSPADM

## 2018-10-09 RX ORDER — ASPIRIN 81 MG/1
81 TABLET ORAL DAILY
Status: DISCONTINUED | OUTPATIENT
Start: 2018-10-10 | End: 2018-10-12 | Stop reason: HOSPADM

## 2018-10-09 RX ORDER — ATORVASTATIN CALCIUM 80 MG/1
80 TABLET, FILM COATED ORAL NIGHTLY
Status: DISCONTINUED | OUTPATIENT
Start: 2018-10-09 | End: 2018-10-09 | Stop reason: ALTCHOICE

## 2018-10-09 RX ORDER — PANTOPRAZOLE SODIUM 40 MG/1
40 TABLET, DELAYED RELEASE ORAL
Status: DISCONTINUED | OUTPATIENT
Start: 2018-10-10 | End: 2018-10-12 | Stop reason: HOSPADM

## 2018-10-09 RX ORDER — SODIUM CHLORIDE 0.9 % (FLUSH) 0.9 %
10 SYRINGE (ML) INJECTION PRN
Status: DISCONTINUED | OUTPATIENT
Start: 2018-10-09 | End: 2018-10-12 | Stop reason: HOSPADM

## 2018-10-09 RX ORDER — ASPIRIN 325 MG
325 TABLET ORAL ONCE
Status: COMPLETED | OUTPATIENT
Start: 2018-10-09 | End: 2018-10-09

## 2018-10-09 RX ORDER — ESOMEPRAZOLE MAGNESIUM 20 MG/1
20 FOR SUSPENSION ORAL DAILY
Status: DISCONTINUED | OUTPATIENT
Start: 2018-10-10 | End: 2018-10-09 | Stop reason: CLARIF

## 2018-10-09 RX ADMIN — ASPIRIN 325 MG: 325 TABLET ORAL at 21:07

## 2018-10-09 NOTE — ED NOTES
Pt. placed in gown and on telemetry monitor per protocol. EKG completed and shown to MD at triage. Updated vs obtained. Bed locked with side rails up x2. Call light within reach. No other needs verbalized at this time. Will continue to monitor.             Nicolle Marvin RN  10/09/18 [id number]

## 2018-10-09 NOTE — ED PROVIDER NOTES
GLUCOSE - Abnormal; Notable for the following:     POC Glucose 176 (*)     All other components within normal limits    Narrative:     Performed at:  [facility]  [address] [facility], [address]   Phone [phone]   POCT GLUCOSE - Normal   TROPONIN    Narrative:     Performed at:  [facility] [address], [facility], [address]   Phone [phone]   APTT    Narrative:     Performed at:  [facility] [address], [facility], [address]   Phone [phone]   PROTIME-INR    Narrative:     Performed at:  [facility] [address], [facility], [address]   Phone [phone] PEPTIDE    Narrative:     Performed at:  [facility] [address], [facility], [address] [employer]   Phone [phone]     RADIOLOGY:   Plain x-rays were viewed by me: Ct Head Wo Contrast    Result Date: 10/9/2018  EXAMINATION: CT OF THE HEAD WITHOUT CONTRAST  10/9/2018 6:28 pm TECHNIQUE: CT of the head was performed without the administration of intravenous contrast. Dose modulation, iterative reconstruction, and/or weight based adjustment of the mA/kV was utilized to reduce the radiation dose to as low as reasonably achievable. COMPARISON: 08/17/2016 HISTORY: ORDERING SYSTEM PROVIDED HISTORY: slurred speech difficulty swallowing TECHNOLOGIST PROVIDED HISTORY: Has a \"code stroke\" or \"stroke alert\" been called? ->No Ordering Physician Provided Reason for Exam: slurred speech difficulty swallowing Acuity: Acute Type of Exam: Initial Relevant Medical/Surgical History: Altered Mental Status (pt reports slurred speech and difficulty chewing that started three days ago. pt has history of 4 strokes in the past. ) FINDINGS: BRAIN/VENTRICLES: There is no convincing evidence for acute intracranial hemorrhage, mass effect, or midline shift.   Bilateral basal ganglia lacunar infarctions are identified. Lacunar infarct within the left thalamus noted. Many of these lacunar infarctions appear chronic, however new as compared to 08/17/2016 study. No obvious loss of gray/white matter differentiation. Confluent periventricular and subcortical white matter hypodensities are most compatible with chronic small vessel disease. ORBITS: The visualized portion of the orbits demonstrate no acute abnormality. SINUSES: Mild mucosal thickening of the right anterior ethmoid air cells, otherwise the paranasal sinuses and mastoids are clear. SOFT TISSUES/SKULL:  No acute abnormality of the visualized skull or soft tissues. Multiple remote bilateral basal ganglia lacunar infarctions. No obvious acute intracranial hemorrhage, mass effect, or midline shift. Xr Chest Portable    Result Date: 10/9/2018  EXAMINATION: SINGLE XRAY VIEW OF THE CHEST 10/9/2018 5:55 pm COMPARISON: Radiograph 12/01/2015 HISTORY: ORDERING SYSTEM PROVIDED HISTORY: SOB TECHNOLOGIST PROVIDED HISTORY: Reason for exam:->SOB Ordering Physician Provided Reason for Exam: ALTERED MENTAL STATUS WITH SLURRED SPEECH. SMOKER. HISTORY OF BREAST CANCER, COPD, CVA, DIABETES. Acuity: Acute Type of Exam: Initial FINDINGS: Right IJ port tip projects over the SVC. Implantable device overlies the left hemithorax. Cardiomediastinal silhouette is within normal limits. No pneumothorax or effusion. Lungs are clear. No acute osseous abnormality. No acute cardiopulmonary disease. EKG:  Read by me in the absence of a cardiologist shows:  Sinus rhythm, normal rate, normal conduction intervals, normal axis, LVH, no acute injury pattern, no major change from prior study     CRITICAL CARE:  Total critical care time of 31 minutes (excludes any time for procedures).   This includes but not limited to vital sign monitoring, medication, clinical response to medications, interpretation of diagnostics, review of nursing notes, pertinent

## 2018-10-09 NOTE — ED PROVIDER NOTES
As provider-in-triage, I performed a brief history and physical exam to establish acuity, and ordered appropriate tests to develop a basic plan of care. Patient will have a more thorough history and physical exam and will be documented by the treating KEVIN and/or physician who will evaluate the patient. PROVIDER IN Rickey Marques 28  eMERGENCY dEPARTMENT eNCOUnter   Pt Name: Shreyas Jackson  MRN: [id number]  Tessgfmoises 1955  Date of evaluation: 10/9/2018  PCP: Daniele Eric MD    Chief Complaint   Patient presents with    Altered Mental Status     pt reports slurred speech and difficulty chewing that started three days ago. pt has history of 4 strokes in the past.        HISTORY OF PRESENT ILLNESS  Shreyas Jackson is a 58 y.o. female who presents to the emergency department with complaints of difficulty speaking/steroids paged ×3 days, and today has been noticed that she has also been difficulty swallowing. She has history of stroke ×4. She denies dizziness/lightheadedness, weakness, facial asymmetry or syncope. No chest pain or shortness of breath. Patient  reports that he waited this long because he \"wasn't sure. \" No other complaints or concerns at this time. Patient hasn't been taking any of her meds x 1-2 months. PHYSICAL EXAM  /76   Pulse 85   Temp 98.4 °F (36.9 °C) (Oral)   Resp 16   Ht 5' 4\" (1.626 m)   Wt 145 lb (65.8 kg)   SpO2 99%   BMI 24.89 kg/m²     On brief exam with patient sitting up, the patient appears well-hydrated, well-nourished, and in no acute distress. Chronically ill-appearing. Mucous membranes are moist.    Skin is dry. Mental status is normal. Moves all extremities, and is without facial droop. Speech is clear. Heart is regular rate and rhythm. Breathing is unlabored. Lungs clear to auscultation bilaterally.     Plan:    Medications - No data to display    LABS:   Labs Reviewed   CBC WITH AUTO

## 2018-10-10 ENCOUNTER — APPOINTMENT (OUTPATIENT)
Dept: MRI IMAGING | Age: 63
DRG: 066 | End: 2018-10-10
Payer: MEDICARE

## 2018-10-10 LAB
CHOLESTEROL, TOTAL: 197 MG/DL (ref 0–199)
EKG ATRIAL RATE: 84 BPM
EKG DIAGNOSIS: NORMAL
EKG P AXIS: 66 DEGREES
EKG P-R INTERVAL: 158 MS
EKG Q-T INTERVAL: 362 MS
EKG QRS DURATION: 90 MS
EKG QTC CALCULATION (BAZETT): 427 MS
EKG R AXIS: 28 DEGREES
EKG T AXIS: 67 DEGREES
EKG VENTRICULAR RATE: 84 BPM
GLUCOSE BLD-MCNC: 110 MG/DL (ref 70–99)
GLUCOSE BLD-MCNC: 138 MG/DL (ref 70–99)
GLUCOSE BLD-MCNC: 152 MG/DL (ref 70–99)
GLUCOSE BLD-MCNC: 246 MG/DL (ref 70–99)
HDLC SERPL-MCNC: 51 MG/DL (ref 40–60)
LDL CHOLESTEROL CALCULATED: 113 MG/DL
PERFORMED ON: ABNORMAL
TRIGL SERPL-MCNC: 167 MG/DL (ref 0–150)
VLDLC SERPL CALC-MCNC: 33 MG/DL

## 2018-10-10 PROCEDURE — 6370000000 HC RX 637 (ALT 250 FOR IP): Performed by: INTERNAL MEDICINE

## 2018-10-10 PROCEDURE — 70551 MRI BRAIN STEM W/O DYE: CPT

## 2018-10-10 PROCEDURE — 2060000000 HC ICU INTERMEDIATE R&B

## 2018-10-10 PROCEDURE — 2580000003 HC RX 258: Performed by: INTERNAL MEDICINE

## 2018-10-10 PROCEDURE — 96374 THER/PROPH/DIAG INJ IV PUSH: CPT

## 2018-10-10 PROCEDURE — 99223 1ST HOSP IP/OBS HIGH 75: CPT | Performed by: PSYCHIATRY & NEUROLOGY

## 2018-10-10 PROCEDURE — 93010 ELECTROCARDIOGRAM REPORT: CPT | Performed by: INTERNAL MEDICINE

## 2018-10-10 PROCEDURE — 99223 1ST HOSP IP/OBS HIGH 75: CPT | Performed by: INTERNAL MEDICINE

## 2018-10-10 PROCEDURE — 93291 INTERROG DEV EVAL SCRMS IP: CPT | Performed by: INTERNAL MEDICINE

## 2018-10-10 PROCEDURE — 6360000002 HC RX W HCPCS: Performed by: INTERNAL MEDICINE

## 2018-10-10 PROCEDURE — 96372 THER/PROPH/DIAG INJ SC/IM: CPT

## 2018-10-10 PROCEDURE — 6370000000 HC RX 637 (ALT 250 FOR IP): Performed by: HOSPITALIST

## 2018-10-10 RX ORDER — NICOTINE POLACRILEX 4 MG
15 LOZENGE BUCCAL PRN
Status: DISCONTINUED | OUTPATIENT
Start: 2018-10-10 | End: 2018-10-12 | Stop reason: HOSPADM

## 2018-10-10 RX ORDER — DEXTROSE MONOHYDRATE 50 MG/ML
100 INJECTION, SOLUTION INTRAVENOUS PRN
Status: DISCONTINUED | OUTPATIENT
Start: 2018-10-10 | End: 2018-10-12 | Stop reason: HOSPADM

## 2018-10-10 RX ORDER — ATORVASTATIN CALCIUM 80 MG/1
40 TABLET, FILM COATED ORAL NIGHTLY
Status: DISCONTINUED | OUTPATIENT
Start: 2018-10-10 | End: 2018-10-12 | Stop reason: HOSPADM

## 2018-10-10 RX ORDER — DEXTROSE MONOHYDRATE 25 G/50ML
12.5 INJECTION, SOLUTION INTRAVENOUS PRN
Status: DISCONTINUED | OUTPATIENT
Start: 2018-10-10 | End: 2018-10-12 | Stop reason: HOSPADM

## 2018-10-10 RX ORDER — AMLODIPINE BESYLATE 5 MG/1
5 TABLET ORAL DAILY
Status: DISCONTINUED | OUTPATIENT
Start: 2018-10-10 | End: 2018-10-12 | Stop reason: HOSPADM

## 2018-10-10 RX ADMIN — ENOXAPARIN SODIUM 40 MG: 40 INJECTION SUBCUTANEOUS at 10:07

## 2018-10-10 RX ADMIN — Medication 10 ML: at 23:10

## 2018-10-10 RX ADMIN — INSULIN LISPRO 2 UNITS: 100 INJECTION, SOLUTION INTRAVENOUS; SUBCUTANEOUS at 13:13

## 2018-10-10 RX ADMIN — Medication 10 ML: at 00:54

## 2018-10-10 RX ADMIN — ASPIRIN 81 MG: 81 TABLET, COATED ORAL at 10:07

## 2018-10-10 RX ADMIN — AMLODIPINE BESYLATE 5 MG: 5 TABLET ORAL at 13:14

## 2018-10-10 RX ADMIN — ATORVASTATIN CALCIUM 40 MG: 80 TABLET, FILM COATED ORAL at 23:10

## 2018-10-10 RX ADMIN — INSULIN LISPRO 4 UNITS: 100 INJECTION, SOLUTION INTRAVENOUS; SUBCUTANEOUS at 18:18

## 2018-10-10 ASSESSMENT — PAIN DESCRIPTION - FREQUENCY: FREQUENCY: INTERMITTENT

## 2018-10-10 ASSESSMENT — ENCOUNTER SYMPTOMS
CHEST TIGHTNESS: 0
VOMITING: 0
TROUBLE SWALLOWING: 1
NAUSEA: 0
SHORTNESS OF BREATH: 0
DIARRHEA: 0
ABDOMINAL PAIN: 0

## 2018-10-10 ASSESSMENT — PAIN DESCRIPTION - DESCRIPTORS: DESCRIPTORS: CRAMPING

## 2018-10-10 ASSESSMENT — PAIN DESCRIPTION - LOCATION: LOCATION: ABDOMEN

## 2018-10-10 ASSESSMENT — PAIN DESCRIPTION - ORIENTATION: ORIENTATION: RIGHT;LOWER

## 2018-10-10 ASSESSMENT — PAIN SCALES - GENERAL
PAINLEVEL_OUTOF10: 3
PAINLEVEL_OUTOF10: 0

## 2018-10-10 NOTE — CONSULTS
 Neuropathy     of feet    osteoporosis     Stroke (cerebrum) [facility]) Dec 11 2015    TIA 6/1/2012    Tobacco abuse     current        Past Surgical History:   Procedure Laterality Date    BREAST SURGERY  03/12/12    left breast segmental mastectomy    COLONOSCOPY      CORONARY ANGIOPLASTY WITH STENT PLACEMENT      2010    CORONARY ANGIOPLASTY WITH STENT PLACEMENT  12/2014    INSERTABLE CARDIAC MONITOR      TONSILLECTOMY      TUBAL LIGATION      TUNNELED VENOUS PORT PLACEMENT  6/6/12       Allergies   Allergen Reactions    Lyrica [Pregabalin]      Hallucinated with this. Hallucinated with this.  Metformin Diarrhea       Social History:  Reviewed. reports that she has been smoking Cigarettes. She has a 60.00 pack-year smoking history. She has never used smokeless tobacco. She reports that she does not drink alcohol or use drugs. Family History:  Reviewed. family history includes Cancer in her maternal grandmother; Diabetes in her father and mother; Heart Disease in her mother. Review of System:  All other systems reviewed except for that noted above. Pertinent negatives and positives are:       · General: negative for fever, chills   · Ophthalmic ROS: negative for - eye pain or loss of vision  · ENT ROS: negative for - headaches, sore throat   · Respiratory: negative for - cough, sputum  · Cardiovascular: Reviewed in HPI  · Gastrointestinal: negative for - abdominal pain, diarrhea, N/V  · Hematology: negative for - bleeding, blood clots, bruising or jaundice  · Genito-Urinary:  negative for - Dysuria or incontinence  · Musculoskeletal: negative for - Joint swelling, muscle pain  · Neurological: negative for - confusion, dizziness, headaches +   · Psychiatric: No anxiety, no depression. · Dermatological: negative for - rash    Physical Examination:  Vitals:    10/10/18 0730   BP: (!) 190/79   Pulse: 72   Resp: 14   Temp:    SpO2:       No intake/output data recorded.    Wt Readings from Last 3 Encounters:   10/10/18 144 lb 13.5 oz (65.7 kg)   18 148 lb (67.1 kg)   17 145 lb (65.8 kg)     Temp  Av.9 °F (36.6 °C)  Min: 96.8 °F (36 °C)  Max: 98.6 °F (37 °C)  Pulse  Av.4  Min: 70  Max: 85  BP  Min: 128/76  Max: 195/29  SpO2  Av.7 %  Min: 82 %  Max: 100 %  No intake or output data in the 24 hours ending 10/10/18 1003    · Telemetry: Sinus rhythm   · Constitutional: Oriented. No distress. · Head: Normocephalic and atraumatic. · Mouth/Throat: Oropharynx is clear and moist.   · Eyes: Conjunctivae normal. EOM are normal.   · Neck: Neck supple. No rigidity. No JVD present. · Cardiovascular: Normal rate, regular rhythm, S1&S2. Systolic murmur   · Pulmonary/Chest: Bilateral respiratory sounds. No wheezes, No rhonchi. · Abdominal: Soft. Bowel sounds present. No distension, No tenderness. · Musculoskeletal: No tenderness. No edema    · Lymphadenopathy: Has no cervical adenopathy. · Neurological: Awake and follows command   · Skin: Skin is warm and dry. No rash noted. · Psychiatric: Has a normal behavior     Labs, diagnostic and imaging results reviewed. Reviewed.    Recent Labs      10/09/18   1749   NA  137   K  3.8   CL  102   CO2  23   BUN  21*   CREATININE  1.1     Recent Labs      10/09/18   1748   WBC  8.3   HGB  11.7*   HCT  35.5*   MCV  91.4   PLT  193     Lab Results   Component Value Date    CKTOTAL 109 2010    CKMB 1.39 05/15/2010    TROPONINI <0.01 10/09/2018     No results found for: BNP  Lab Results   Component Value Date    PROTIME 10.8 10/09/2018    PROTIME 16.6 2016    PROTIME 14.8 2016    INR 0.95 10/09/2018    INR 1.45 2016    INR 1.29 2016     Lab Results   Component Value Date    CHOL 128 2017    HDL 47 2017    HDL 36 2012    TRIG 119 2017       ECG: Sinus rhythm   Echo: 2016:   -Normal left ventricle size, wall thickness and systolic function with an   estimated ejection fraction of of midfoot ([address] 08/22/2017    Diabetic ulcer of toe of left foot associated with type 2 diabetes mellitus ([address]     Chronic idiopathic constipation 04/24/2017    Decreased pulses in feet     Diabetic ulcer of left foot associated with type 2 diabetes mellitus ([address]     Paresis ([address] 08/13/2016    Arterial ischemic stroke, ICA, left, acute ([address]     DM (diabetes mellitus), secondary, uncontrolled, w/neurologic complic ([address]     HTN (hypertension), benign     Dysphagia due to recent stroke 08/11/2016    Dysarthria 08/11/2016    Hypokalemia 08/11/2016    Encounter for electronic analysis of reveal event recorder 08/11/2016    Paroxysmal atrial fibrillation ([address]     Vitamin D deficiency 03/14/2016    Acute CVA (cerebrovascular accident) ([address] 01/07/2016    Coronary artery disease involving native coronary artery of native heart without angina pectoris     Severe mitral regurgitation 12/18/2015    Essential hypertension     Weakness of right leg 12/12/2015    Cerebrovascular accident (CVA) due to occlusion of cerebral artery ([address]     Precordial pain 11/02/2015    Diabetic polyneuropathy ([address] 09/03/2015    Dermatophytosis of nail 09/03/2015    Generalized osteoarthritis 01/21/2008      Active Hospital Problems    Diagnosis Date Noted    Ischemic stroke ([address] [I63.9] 10/09/2018       Assessment and plan:     - Paroxysmal atrial fibrillation:    - Loop recorder was interrogated and she has had recurrent episodes of atrial fibrillation.    - She was seen by EP in 2016 and anticoagulation was strongly recommended. - She is not taking anticoagulation at this time. High [id number] score of 6 (DM, HTN, female, stroke x2, PAD) and high risk for stroke and thromboembolism. Anticoagulation is recommended. I discussed anticoagulation to decrease the risk of thromboembolic events including stroke. Benefits and alternatives were discussed with patient. Risk of bleeding was discussed. Patient verbalized understanding.      -  CrCl calculated 53 ml/min   - Both Xarelto 20 mg/day or Eliquis 5 mg bid is acceptable. Pharmacy to check cost for patient. - She appears asymptomatic with Afib. Reports no palpitation, chest pain, etc.    - Echo ordered. Prior LESA in 2015 reviewed. - Recurrent stroke    Neurology to further evaluate. She can have MRI with her loop recorder.     - HTN: Not controlled. Norvasc added. Will defer to primary team     Thank you for allowing me to participate in the care of [facility]: This report was transcribed using voice recognition software. Every effort was made to ensure accuracy, however, inadvertent computerized transcription errors may be present.      Liv Barron MD, MPH  [place] [address]   Office: [phone]

## 2018-10-10 NOTE — ED PROVIDER NOTES
20 MG PACK    Take 20 mg by mouth daily    GLUCOSE BLOOD (BLOOD GLUCOSE TEST STRIPS) STRP    Use as directed - test three times daily    INSULIN GLARGINE (TOUJEO SOLOSTAR) 300 UNIT/ML INJECTION PEN    Inject 10 Units into the skin nightly    INSULIN LISPRO (HUMALOG) 100 UNIT/ML INJECTION VIAL    Inject into the skin 3 times daily (before meals)    INSULIN LISPRO (HUMALOG) 100 UNIT/ML PEN    Inject 0-10 Units into the skin 3 times daily (before meals) Less than 130 - no insulin; 130-150 = 5 units, greater than 150 = 10 units    LANCETS MISC    Use for each blood sugar test         ALLERGIES     Lyrica [pregabalin] and Metformin    FAMILY HISTORY       Family History   Problem Relation Age of Onset    Heart Disease Mother     Diabetes Mother     Diabetes Father     Cancer Maternal Grandmother         Breast          SOCIAL HISTORY       Social History     Social History    Marital status:      Spouse name: Maggy Mayer Number of children: 2    Years of education: N/A     Occupational History    imagining technician [employer]     Social History Main Topics    Smoking status: Current Some Day Smoker     Packs/day: 1.50     Years: 40.00     Types: Cigarettes     Last attempt to quit: 8/15/2016    Smokeless tobacco: Never Used    Alcohol use No    Drug use: No    Sexual activity: Not Currently      Comment:  from  but not legal separation. Other Topics Concern    None     Social History Narrative    Lives with son. SCREENINGS   NIH Stroke Scale  Interval: Baseline  Level of Consciousness (1a. ): Alert  LOC Questions (1b. ):  Answers both correctly  LOC Commands (1c. ): Obeys both correctly  Best Gaze (2. ): Normal  Visual (3. ): No visual loss  Facial Palsy (4. ): (!) Minor (minor left sided facial droop)  Motor Arm, Left (5a. ): No drift  Motor Arm, Right (5b. ): No drift  Motor Leg, Left (6a. ): No drift  Motor Leg, Right (6b. ): No drift  Limb Ataxia (7. ): family were informed of the results of any tests, a time was given to answer questions, a plan was proposed and they agreed with plan. FINAL IMPRESSION      1.  Cerebrovascular accident (CVA), unspecified mechanism ([facility]          [address] Admitted 10/09/2018 09:54:20 PM      PATIENT REFERRED TO:  Manuel Barber MD  [facility]  [phone]            DISCHARGE MEDICATIONS:  New Prescriptions    No medications on file       DISCONTINUED MEDICATIONS:  Discontinued Medications    AMLODIPINE (NORVASC) 5 MG TABLET    Take 1 tablet by mouth daily    ASPIRIN EC 81 MG EC TABLET    Take 1 tablet by mouth daily    ATORVASTATIN (LIPITOR) 80 MG TABLET    Take 1 tablet by mouth nightly              (Please note that portions of this note were completed with a voice recognition program.  Efforts were made to edit the dictations but occasionally words are mis-transcribed.)    GREGOR Quiles CNP (electronically signed)           GREGOR Quiles CNP  10/10/18 4875

## 2018-10-10 NOTE — ED NOTES
Receipt of report confirmed with receiving nurse, updates given on pt status and plan of care. RN given opportunity to ask questions and verbalized understanding.        Julisa Hand RN  10/10/18 3513

## 2018-10-11 LAB
GLUCOSE BLD-MCNC: 121 MG/DL (ref 70–99)
GLUCOSE BLD-MCNC: 133 MG/DL (ref 70–99)
GLUCOSE BLD-MCNC: 137 MG/DL (ref 70–99)
GLUCOSE BLD-MCNC: 144 MG/DL (ref 70–99)
LV EF: 65 %
LVEF MODALITY: NORMAL
PERFORMED ON: ABNORMAL

## 2018-10-11 PROCEDURE — G8979 MOBILITY GOAL STATUS: HCPCS

## 2018-10-11 PROCEDURE — 99232 SBSQ HOSP IP/OBS MODERATE 35: CPT | Performed by: NURSE PRACTITIONER

## 2018-10-11 PROCEDURE — 93880 EXTRACRANIAL BILAT STUDY: CPT

## 2018-10-11 PROCEDURE — 97535 SELF CARE MNGMENT TRAINING: CPT

## 2018-10-11 PROCEDURE — 6370000000 HC RX 637 (ALT 250 FOR IP): Performed by: INTERNAL MEDICINE

## 2018-10-11 PROCEDURE — G8987 SELF CARE CURRENT STATUS: HCPCS

## 2018-10-11 PROCEDURE — 6370000000 HC RX 637 (ALT 250 FOR IP): Performed by: NURSE PRACTITIONER

## 2018-10-11 PROCEDURE — G8996 SWALLOW CURRENT STATUS: HCPCS

## 2018-10-11 PROCEDURE — 97162 PT EVAL MOD COMPLEX 30 MIN: CPT

## 2018-10-11 PROCEDURE — 97530 THERAPEUTIC ACTIVITIES: CPT

## 2018-10-11 PROCEDURE — 99232 SBSQ HOSP IP/OBS MODERATE 35: CPT | Performed by: PSYCHIATRY & NEUROLOGY

## 2018-10-11 PROCEDURE — 97165 OT EVAL LOW COMPLEX 30 MIN: CPT

## 2018-10-11 PROCEDURE — G8997 SWALLOW GOAL STATUS: HCPCS

## 2018-10-11 PROCEDURE — 93306 TTE W/DOPPLER COMPLETE: CPT

## 2018-10-11 PROCEDURE — 92526 ORAL FUNCTION THERAPY: CPT

## 2018-10-11 PROCEDURE — G8978 MOBILITY CURRENT STATUS: HCPCS

## 2018-10-11 PROCEDURE — 92610 EVALUATE SWALLOWING FUNCTION: CPT

## 2018-10-11 PROCEDURE — 2060000000 HC ICU INTERMEDIATE R&B

## 2018-10-11 PROCEDURE — 92523 SPEECH SOUND LANG COMPREHEN: CPT

## 2018-10-11 PROCEDURE — G8988 SELF CARE GOAL STATUS: HCPCS

## 2018-10-11 PROCEDURE — 2580000003 HC RX 258: Performed by: INTERNAL MEDICINE

## 2018-10-11 PROCEDURE — 6370000000 HC RX 637 (ALT 250 FOR IP): Performed by: HOSPITALIST

## 2018-10-11 RX ADMIN — Medication 10 ML: at 20:47

## 2018-10-11 RX ADMIN — APIXABAN 5 MG: 5 TABLET, FILM COATED ORAL at 20:47

## 2018-10-11 RX ADMIN — ASPIRIN 81 MG: 81 TABLET, COATED ORAL at 09:56

## 2018-10-11 RX ADMIN — AMLODIPINE BESYLATE 5 MG: 5 TABLET ORAL at 09:40

## 2018-10-11 RX ADMIN — ATORVASTATIN CALCIUM 40 MG: 80 TABLET, FILM COATED ORAL at 20:46

## 2018-10-11 RX ADMIN — Medication 10 ML: at 09:40

## 2018-10-11 RX ADMIN — PANTOPRAZOLE SODIUM 40 MG: 40 TABLET, DELAYED RELEASE ORAL at 06:25

## 2018-10-11 RX ADMIN — APIXABAN 5 MG: 5 TABLET, FILM COATED ORAL at 09:40

## 2018-10-11 ASSESSMENT — PAIN SCALES - GENERAL
PAINLEVEL_OUTOF10: 0

## 2018-10-11 NOTE — PROGRESS NOTES
Physical Therapy    Facility/Department: [address]  Initial Assessment    NAME: Arpit Aguilera  : 1955  MRN: [id number]    Date of Service: 10/11/2018    Discharge Recommendations:  Arpit Aguilera scored a 16/24 on the AM-PAC short mobility form. Current research shows that an AM-PAC score of 17 or less is typically not associated with a discharge to the patient's home setting. Based on the patients AM-PAC score and their current functional mobility deficits, it is recommended that the patient have 5-7 sessions per week of Physical Therapy at d/c to increase the patients independence. PT Equipment Recommendations  Equipment Needed: No (Pt has [id number])    Patient Diagnosis(es): The encounter diagnosis was Cerebrovascular accident (CVA), unspecified mechanism ([address] has a past medical history of Allergic rhinitis due to pollen; Arthritis; Breast cancer ([address] Breast cancer, left breast ([address] Bronchitis, mucopurulent recurrent ([address] Cerebrovascular disease; COPD (chronic obstructive pulmonary disease) ([address] Coronary artery disease; DDD (degenerative disc disease), lumbosacral; Depression; Diabetes mellitus ([address] Diabetes mellitus out of control ([address] Diabetic retinopathy ([address] Hand pain; Herniated lumbar disc without myelopathy; HTN (hypertension); Hyperlipidemia LDL goal <70; Insomnia; Irritable bowel syndrome; Neuropathy; osteoporosis; Stroke (cerebrum) ([address] TIA; and Tobacco abuse.   has a past surgical history that includes Tonsillectomy; Tubal ligation; Coronary angioplasty with stent; Breast surgery (12); Colonoscopy; Tunneled venous port placement (12); Coronary angioplasty with stent (2014); and Insertable Cardiac Monitor.     Restrictions  Restrictions/Precautions  Restrictions/Precautions: Fall Risk (high fall risk)  Position Activity Restriction  Other position/activity restrictions: Arpit Aguilera is a 58 y.o. female who presents to the emergency

## 2018-10-11 NOTE — PROGRESS NOTES
functional limits    Orientation  Overall Orientation Status: Within Functional Limits  Observation/Palpation  Posture: Good  Balance  Sitting Balance: Stand by assistance  Standing Balance: Minimal assistance (min A progressing to CGA for ambulation; CGA progressing to SBA at sink)  Standing Balance  Time: x12-14 min total  Activity: functional mobility to/from bathroom, LB ADLs, grooming  Sit to stand: Minimal assistance (from EOB)  Stand to sit: Contact guard assistance (to bedside chair)  Comment: no LOB  Functional Mobility  Functional - Mobility Device: Rolling Walker  Activity: To/from bathroom  Assist Level: Dependent/Total  Functional Mobility Comments: min A of 2 mobility to bathroom with cues for midline orientation with RW; progressed to CGA out of bathroom; slow pace and decreased step length  Toilet Transfers  Toilet - Technique: Ambulating  Equipment Used: Standard toilet (L grab bar)  Toilet Transfer: Moderate assistance  Toilet Transfers Comments: mod A onto toilet progressing to CGA; min A off toilet on both occasions  ADL  Grooming: Contact guard assistance;Stand by assistance;Setup (CGA progressing to SBA for face washing, hand washing, and oral care in stance at sink)  UE Bathing: Stand by assistance;Setup (seated on toilet)  LE Bathing: Contact guard assistance;Setup (to wash in stance; pt bathed down to above ankles)  UE Dressing: Stand by assistance;Setup (gown)  LE Dressing: Moderate assistance (to thread depends)  Toileting: Contact guard assistance (incontinent of urine; CGA for clothing management in stance)  Additional Comments: Pt required increased time to complete ADLs.  Pt completed them at slow pace  Tone RUE  RUE Tone: Normotonic  Tone LUE  LUE Tone: Normotonic  Coordination  Movements Are Fluid And Coordinated: Yes  Coordination and Movement description: Decreased speed     Bed mobility  Scooting: Stand by assistance  Comment: pt seated EOB upon arrival  Transfers  Sit to stand: Tolerance: Patient Tolerated treatment well  Safety Devices  Safety Devices in place: Yes  Type of devices: All fall risk precautions in place; Left in chair;Chair alarm in place;Nurse notified;Call light within reach; Patient at risk for falls         Plan   Plan  Times per week: 5-7x  Times per day: Daily  Current Treatment Recommendations: Strengthening, Balance Training, Functional Mobility Training, Neuromuscular Re-education, Safety Education & Training, Self-Care / ADL, Equipment Evaluation, Education, & procurement    G-Code  OT G-codes  Functional Limitation: Self care  Self Care Current Status (): At least 40 percent but less than 60 percent impaired, limited or restricted  Self Care Goal Status (): At least 20 percent but less than 40 percent impaired, limited or restricted    AM-PAC Score        AM-[facility] Inpatient Daily Activity Raw Score: 17  AM-PAC Inpatient ADL T-Scale Score : 37.26  ADL Inpatient CMS 0-100% Score: 50.11  ADL Inpatient CMS G-Code Modifier : CK    Goals  Short term goals  Time Frame for Short term goals: Discharge  Short term goal 1: Supervision for all UB ADLs. Short term goal 2: Min A for all LB ADLs. Short term goal 3: SBA for functional mobility. Short term goal 4: SBA for functional transfers to/from all ADL surfaces.   Long term goals  Time Frame for Long term goals : STG=LTG  Patient Goals   Patient goals : Pt did not state       Therapy Time   Individual Concurrent Group Co-treatment   Time In 6330         Time Out 0917         Minutes 53               Timed Code Treatment Minutes:   38    Total Treatment Minutes:  [address] , OT   [place], [place] [id number]

## 2018-10-11 NOTE — PROGRESS NOTES
[facility] PROGRESS NOTE    10/11/2018 2:13 PM        Name: Pankaj Chase Admitted: 10/9/2018  Primary Care Provider: Vamsi Sommer MD (Tel: [phone])    Brief Course:  Adm. With   Slurring of speech    Subjective:    Better         Reviewed interval ancillary notes    Current Medications    apixaban (ELIQUIS) tablet 5 mg BID   glucose (GLUTOSE) 40 % oral gel 15 g PRN   dextrose 50 % solution 12.5 g PRN   glucagon (rDNA) injection 1 mg PRN   dextrose 5 % solution PRN   insulin lispro (HUMALOG) injection pen 0-12 Units TID WC   insulin lispro (HUMALOG) injection pen 0-6 Units Nightly   atorvastatin (LIPITOR) tablet 40 mg Nightly   amLODIPine (NORVASC) tablet 5 mg Daily   aspirin EC tablet 81 mg Daily   sodium chloride flush 0.9 % injection 10 mL 2 times per day   sodium chloride flush 0.9 % injection 10 mL PRN   ondansetron (ZOFRAN) injection 4 mg Q6H PRN   pantoprazole (PROTONIX) tablet 40 mg QAM AC       Objective:  BP (!) 108/58   Pulse 86   Temp 97.6 °F (36.4 °C) (Temporal)   Resp 16   Ht 5' 4\" (1.626 m)   Wt 151 lb 3.8 oz (68.6 kg)   SpO2 98%   BMI 25.96 kg/m²     Intake/Output Summary (Last 24 hours) at 10/11/18 1413  Last data filed at 10/11/18 0943   Gross per 24 hour   Intake              590 ml   Output              750 ml   Net             -160 ml      Wt Readings from Last 3 Encounters:   10/11/18 151 lb 3.8 oz (68.6 kg)   01/25/18 148 lb (67.1 kg)   08/30/17 145 lb (65.8 kg)       General appearance:  Appears comfortable  Eyes: Sclera clear. Pupils equal.  ENT: Moist oral mucosa. Trachea midline, no adenopathy. Cardiovascular: Regular rhythm, normal S1, S2. No murmur. No edema in lower extremities  Respiratory: Not using accessory muscles. Good inspiratory effort. Clear to auscultation bilaterally, no wheeze or crackles.    GI: Abdomen soft, no tenderness, not distended, normal bowel

## 2018-10-11 NOTE — PROGRESS NOTES
Speech Language/Pathology   SPEECH LANGUAGE AND CLINICAL BEDSIDE SWALLOWING EVALUATION   Speech Therapy Department     Patient Name:  Rony Sebastian  :  1955  Pain level:Denies at this time   Medical Diagnosis:  Ischemic stroke [facility]) [I63.9]  Ischemic stroke ([facility] [I63.9]    HPI:\"The patient is a 76-year-old -American female who gives a very vague history of \"what she describes as not being able to talk properly for the past two to three days, associated with some generalized pain that she says was very vague. The patient's  who is with her right now who does not live with her states that he went to check on her like he does every day and she just did not sound right and she did not appear right because of which he brought her to the hospital.  At the time of my exam, the patient does appear a little slow, but is otherwise cooperative, answering questions appropriately. \"  MRI revealed:  Acute infarct involving right basal ganglia.  No evidence of acute   intracranial hemorrhage.  No mass effect.       Remote left basal ganglia/thalamic infarcts.  Moderate chronic microvascular   ischemic change, progressed.         CXR 10/9:  Impression   No acute cardiopulmonary disease.         CLINICAL SWALLOW EVALUATION:  Dysphagia Treatment Diagnosis: Moderate Oropharyngeal Dysphagia     Impressions: Pt was positioned upright in chair on RA. She reported her swallowing is \"slow. \" Pt with a hx of dysphagia from prior CVA. Endorsed some coughing with intake prior to admission but denied consuming an altered diet. Pt denied any coughing or throat clearing with breakfast this AM. Oral mechanism examination revealed reduced agility and decreased coordination. Various consistency trials were provided to assess swallow function. Overall dysphagia was characterized by prolonged oral holding, decreased bolus manipulation, reduced mastication, delayed swallow initiation and decreased laryngeal elevation.  Oral

## 2018-10-11 NOTE — PROGRESS NOTES
Speech Language Pathology  Attempt   Rony Sebastian   1955     SLP eval and treat orders received. Attempted to see pt x2. PT/OT evaluating pt at initial time of attempt. Pt leaving floor for testing at time of second attempt. Will re-attempt to follow-up as therapy schedule allows.     Thanks,  Jamal Baig, [address] [facility]  Speech Language Pathologist

## 2018-10-11 NOTE — PROGRESS NOTES
response extensor on the left and withdrawal on the right. Gait is impaired.   DATA :  LABS:  General Labs:    CBC:   Lab Results   Component Value Date    WBC 8.3 10/09/2018    RBC 3.89 10/09/2018    HGB 11.7 10/09/2018    HCT 35.5 10/09/2018    MCV 91.4 10/09/2018    MCH 30.0 10/09/2018    MCHC 32.8 10/09/2018    RDW 14.0 10/09/2018     10/09/2018    MPV 9.3 10/09/2018     BMP:    Lab Results   Component Value Date     10/09/2018    K 3.8 10/09/2018     10/09/2018    CO2 23 10/09/2018    BUN 21 10/09/2018    LABALBU 3.3 10/09/2018    CREATININE 1.1 10/09/2018    CALCIUM 9.2 10/09/2018    GFRAA >60 10/09/2018    GFRAA >60 12/02/2012    LABGLOM 50 10/09/2018    GLUCOSE 193 10/09/2018     RADIOLOGY REVIEW:  I have reviewed radiology image(s) and reports(s) of:  MRI brain    IMPRESSION :  Acute right basal ganglia infarction  Moderately severe diabetic peripheral neuropathy  Paroxysmal atrial fibrillation  Other cerebrovascular risk factors including diabetes hypertension and hyperlipidemia  Patient Active Problem List   Diagnosis    Neuropathy    Mixed hyperlipidemia    Uncontrolled type 2 diabetes mellitus with hyperglycemia, with long-term current use of insulin ([address]    Coronary artery disease    Smoking    Breast cancer, left breast ([address]    DDD (degenerative disc disease), lumbosacral    Diabetic peripheral neuropathy (HCC)    Diabetic retinopathy ([address]    COPD (chronic obstructive pulmonary disease) ([address]    Arthritis    Ataxia    GERD (gastroesophageal reflux disease)    Diabetic polyneuropathy (HCC)    Dermatophytosis of nail    Precordial pain    Weakness of right leg    Cerebrovascular accident (CVA) due to occlusion of cerebral artery ([address]    Essential hypertension    Severe mitral regurgitation    Coronary artery disease involving native coronary artery of native heart without angina pectoris    Cerebrovascular accident (CVA) ([address]    Generalized osteoarthritis

## 2018-10-11 NOTE — PROGRESS NOTES
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 (HCC)     Ischemic stroke ([address] 10/09/2018    Atherosclerosis of native artery of left lower extremity with ulceration of midfoot ([address] 08/22/2017    Diabetic ulcer of toe of left foot associated with type 2 diabetes mellitus ([address]     Chronic idiopathic constipation 04/24/2017    Decreased pulses in feet     Diabetic ulcer of left foot associated with type 2 diabetes mellitus ([address]     Paresis ([address] 08/13/2016    Arterial ischemic stroke, ICA, left, acute ([address]     DM (diabetes mellitus), secondary, uncontrolled, w/neurologic complic ([address]     HTN (hypertension), benign     Dysphagia due to recent stroke 08/11/2016    Dysarthria 08/11/2016    Hypokalemia 08/11/2016    Encounter for electronic analysis of reveal event recorder 08/11/2016    Paroxysmal atrial fibrillation ([address]     Vitamin D deficiency 03/14/2016    Cerebrovascular accident (CVA) ([address] 01/07/2016    Coronary artery disease involving native coronary artery of native heart without angina pectoris     Severe mitral regurgitation 12/18/2015    Essential hypertension     Weakness of right leg 12/12/2015    Cerebrovascular accident (CVA) due to occlusion of cerebral artery ([address]     Precordial pain 11/02/2015    Diabetic polyneuropathy ([address] 09/03/2015    Dermatophytosis of nail 09/03/2015    Generalized osteoarthritis 01/21/2008      Active Hospital Problems    Diagnosis Date Noted    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   -She was seen by EP in 2016 and anticoagulation was strongly recommended.   -She is not taking anticoagulation at this time.   -High [id number] score of 6 (DM, HTN, female, stroke x2, PAD) and high risk for stroke and thromboembolism. Anticoagulation is recommended.    -Dr. Nimesh Sullivan and I both discussed anticoagulation to decrease the risk of thromboembolic events including stroke. Benefits and alternatives were discussed with patient. Risk of bleeding was discussed. Patient verbalized understanding.   -Prior LESA in 2015 reviewed by Dr. Nimesh Sullivan  -CrCl calculated 53 ml/min  -Pharmacy consulted for cost  -Will start Eliquis 5 mg bid today  -She appears asymptomatic with Afib. Reports no palpitation, chest pain, etc.   -Echo ordered. Recurrent stroke   -Neurology to further evaluate.   -She can have MRI with her loop recorder.       HTN  BP Readings from Last 3 Encounters:   10/11/18 (!) 151/71   02/26/18 103/71   02/19/18 129/70   -Not controlled.   -Norvasc added yesterday   -Will defer to primary team     [address]   [phone]

## 2018-10-11 NOTE — CARE COORDINATION
Met with patient to offer assistance with discharge, and he has no home care agency preference. Patient referred to [facility], pending a home care order. Financial Counselor noted patient  qualifies for medication assistance from [facility].   MD, Please complete & sign the e-JA under the discharge instructions, AVS/Prescriptions, and or medical necessity note for assistance with discharge planning, Thank you, Abdirziak Leggett, MSANABELLE, [phone]

## 2018-10-12 VITALS
BODY MASS INDEX: 25.83 KG/M2 | OXYGEN SATURATION: 97 % | WEIGHT: 151.3 LBS | TEMPERATURE: 97.2 F | HEIGHT: 64 IN | RESPIRATION RATE: 14 BRPM | HEART RATE: 86 BPM | SYSTOLIC BLOOD PRESSURE: 145 MMHG | DIASTOLIC BLOOD PRESSURE: 86 MMHG

## 2018-10-12 LAB
GLUCOSE BLD-MCNC: 118 MG/DL (ref 70–99)
PERFORMED ON: ABNORMAL

## 2018-10-12 PROCEDURE — 6370000000 HC RX 637 (ALT 250 FOR IP): Performed by: NURSE PRACTITIONER

## 2018-10-12 PROCEDURE — 6370000000 HC RX 637 (ALT 250 FOR IP): Performed by: INTERNAL MEDICINE

## 2018-10-12 PROCEDURE — 92507 TX SP LANG VOICE COMM INDIV: CPT

## 2018-10-12 PROCEDURE — 92526 ORAL FUNCTION THERAPY: CPT

## 2018-10-12 PROCEDURE — 99232 SBSQ HOSP IP/OBS MODERATE 35: CPT | Performed by: PSYCHIATRY & NEUROLOGY

## 2018-10-12 PROCEDURE — 97530 THERAPEUTIC ACTIVITIES: CPT

## 2018-10-12 PROCEDURE — 99231 SBSQ HOSP IP/OBS SF/LOW 25: CPT | Performed by: NURSE PRACTITIONER

## 2018-10-12 PROCEDURE — 2580000003 HC RX 258: Performed by: INTERNAL MEDICINE

## 2018-10-12 PROCEDURE — 97116 GAIT TRAINING THERAPY: CPT

## 2018-10-12 RX ORDER — AMLODIPINE BESYLATE 5 MG/1
5 TABLET ORAL DAILY
Qty: 30 TABLET | Refills: 1 | Status: SHIPPED | OUTPATIENT
Start: 2018-10-12 | End: 2018-11-14 | Stop reason: SDUPTHER

## 2018-10-12 RX ORDER — ASPIRIN 81 MG/1
81 TABLET ORAL DAILY
Qty: 30 TABLET | Refills: 1 | COMMUNITY
Start: 2018-10-12 | End: 2020-10-20 | Stop reason: ALTCHOICE

## 2018-10-12 RX ORDER — ATORVASTATIN CALCIUM 40 MG/1
40 TABLET, FILM COATED ORAL NIGHTLY
Qty: 30 TABLET | Refills: 1 | Status: SHIPPED | OUTPATIENT
Start: 2018-10-12 | End: 2018-11-14 | Stop reason: SDUPTHER

## 2018-10-12 RX ADMIN — APIXABAN 5 MG: 5 TABLET, FILM COATED ORAL at 09:00

## 2018-10-12 RX ADMIN — Medication 10 ML: at 10:25

## 2018-10-12 RX ADMIN — ASPIRIN 81 MG: 81 TABLET, COATED ORAL at 09:00

## 2018-10-12 RX ADMIN — AMLODIPINE BESYLATE 5 MG: 5 TABLET ORAL at 08:30

## 2018-10-12 RX ADMIN — PANTOPRAZOLE SODIUM 40 MG: 40 TABLET, DELAYED RELEASE ORAL at 06:36

## 2018-10-12 NOTE — PROGRESS NOTES
CLINICAL PHARMACY NOTE: MEDS TO [address] Select Patient?: No  Total # of Prescriptions Filled: 3   The following medications were delivered to the patient:  · Amlodipine 5  · Eliquis 5  · Atorvastatin 40  Total # of Interventions Completed: 0  Time Spent (min): 30    Additional Documentation:  Patient's  signed for prescriptions

## 2018-10-12 NOTE — PROGRESS NOTES
with complaints of difficulty speaking/steroids paged ×3 days, and today has been noticed that she has also been difficulty swallowing. She has history of stroke ×4. She denies dizziness/lightheadedness, weakness, facial asymmetry or syncope. No chest pain or shortness of breath. Patient  reports that he waited this long because he \"wasn't sure. \" No other complaints or concerns at this time. Subjective   General  Chart Reviewed: Yes  Additional Pertinent Hx: Arthritis, COPD, CAD, DDD, DM, breast cancer, depression, HTN, TIA, stroke, osteoporosis  Response To Previous Treatment: Not applicable  Family / Caregiver Present: No  Subjective  Subjective: Pt agreeable to PT  General Comment  Comments: Pt seated EOB upon arrival. Pt sttates she has no interest in going to Rehab. She only wants to go home. Lives with son at home and he works nights. After max encouragement and education, pt still insisted on going home as soon as she can. Pain Screening  Patient Currently in Pain: Denies  Vital Signs  Patient Currently in Pain: Denies       Orientation  Orientation  Overall Orientation Status: Within Functional Limits  Objective      Transfers  Sit to Stand: Contact guard assistance  Stand to sit: Contact guard assistance  Stand Pivot Transfers: Contact guard assistance (EOB to bs chair)  Ambulation  Ambulation?: Yes  Ambulation 1  Surface: level tile  Device: Single point cane (Pt insists on not using RW.  Pt does have RW and SPC at home.)  Assistance: Minimal assistance;Contact guard assistance  Quality of Gait: very slow yuan, short stride length, mild R foot drop without VC, mild unsteadiness  Distance: 48' + 48'  Comments: vc's for increased stride length; vc's to wear shoes when walking  Stairs/Curb  Stairs?: Yes  Stairs  # Steps : 6  Stairs Height: 6\"  Rails: Right ascending  Device: Single pt cane  Assistance: Minimal assistance;Contact guard assistance  Comment: Min A when descending; to/from stand with CGAx1 and AAD  Short term goal 3: Pt will be able to ambulate 50ft with CGA and AAD  Short term goal 4: Pt will be able to ascend/descend 3 steps without railing and with min assistx1  Long term goals  Time Frame for Long term goals : LTG=STG    Plan    Plan  Times per week: 5-7  Times per day: Daily  Current Treatment Recommendations: Strengthening, Gait Training, Stair training, Balance Training, Neuromuscular Re-education, Endurance Training, Functional Mobility Training, Transfer Training  Safety Devices  Type of devices:  All fall risk precautions in place, Patient at risk for falls, Left in chair, Call light within reach, Chair alarm in place, Nurse notified  Restraints  Initially in place: No     Therapy Time   Individual Concurrent Group Co-treatment   Time In 0943         Time Out 1007         Minutes 24         Timed Code Treatment Minutes: Mony [address], [address]

## 2018-10-12 NOTE — DISCHARGE SUMMARY
strokes. She was admitted to [facility]. She had an MRI which revealed and acute R basal ganglia stroke. ECHO was unremarkable and carotid duplex was negative for significant stenosis. She was seen by neurology as well as EP. Her loop recorder was interrogated and revealed recurrent episodes of afib. Anticoagulation was recommend 2 years ago however she did not take it. Eliquis was started during current admission. The importance of being compliant was discussed. She was seen by pt/ot/and speech. She was discharged home in stable condition. Consults. IP CONSULT TO HOSPITALIST  IP CONSULT TO NEUROLOGY  IP CONSULT TO NEUROLOGY  IP CONSULT TO CARDIOLOGY  IP CONSULT TO FINANCIAL COUNSELOR  IP CONSULT TO SOCIAL WORK  IP CONSULT TO HOME CARE NEEDS    Physical examination on discharge day. BP (!) 148/88   Pulse 84   Temp 97.8 °F (36.6 °C) (Temporal)   Resp 14   Ht 5' 4\" (1.626 m)   Wt 151 lb 4.8 oz (68.6 kg)   SpO2 97%   BMI 25.97 kg/m²   General appearance. Alert. Looks comfortable. HEENT. Sclera clear. Moist mucus membranes. Cardiovascular. Regular rate and rhythm, normal S1, S2. No murmur. Respiratory. Not using accessory muscles. Clear to auscultation bilaterally, no wheeze. Gastrointestinal. Abdomen soft, non-tender, not distended, normal bowel sounds  Neurology. Facial symmetry. No speech deficits. Moving all extremities equally. Extremities. No edema in lower extremities. Skin. Warm, dry, normal turgor    Condition at time of discharge stable    Medication instructions provided to patient at discharge.      Medication List      START taking these medications    apixaban 5 MG Tabs tablet  Commonly known as:  ELIQUIS  Take 1 tablet by mouth 2 times daily  Notes to patient:  Use: to reduce the risk of stroke and/or blood clots due to atrial fibrillation  Side effects: Notify MD of the following- blood in urine and/or stool, black or tarry stools, bruising easily, confusion  Do not stop taking without

## 2018-10-12 NOTE — PROGRESS NOTES
11/28/2012    Depression     Diabetes mellitus ([facility]     Diabetes mellitus out of control ([facility] 2004    Diabetic retinopathy ([facility] 1/3/2013    Hand pain     and feet neuroapthy from DM    Herniated lumbar disc without myelopathy     HTN (hypertension)     Hyperlipidemia LDL goal <70     Insomnia     Irritable bowel syndrome     Neuropathy     of feet    osteoporosis     Stroke (cerebrum) ([facility] Dec 11 2015    TIA 6/1/2012    Tobacco abuse     current     Family History   Problem Relation Age of Onset    Heart Disease Mother     Diabetes Mother     Diabetes Father     Cancer Maternal Grandmother         Breast     Social History     Social History    Marital status:      Spouse name: Lawerenclesli Shelby Number of children: 2    Years of education: N/A     Occupational History    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 Other Topics Concern    None     Social History Narrative    Lives with son. PHYSICAL EXAMINATION     BP (!) 148/88   Pulse 84   Temp 97.8 °F (36.6 °C) (Temporal)   Resp 14   Ht 5' 4\" (1.626 m)   Wt 151 lb 4.8 oz (68.6 kg)   SpO2 97%   BMI 25.97 kg/m²   This is a well-nourished patient in no acute distress  Awake alert and oriented ×3. Speech is normal.  Pupils equal round reacting to light. Visual fields full. External ocular movements intact. Face symmetrical.  Tongue midline. Strength is 4+ over 5 on the right side and 4/5 on the left side. Tone slightly increased all over. Sensory exam shows decreased light touch in the distal lower extremities. Reflexes barely elicitable in the arms and absent in the legs.   Plantar

## 2018-10-19 LAB
AVERAGE GLUCOSE: NORMAL
HBA1C MFR BLD: 6.7 %

## 2018-10-30 ENCOUNTER — APPOINTMENT (OUTPATIENT)
Dept: GENERAL RADIOLOGY | Age: 63
End: 2018-10-30
Payer: MEDICARE

## 2018-10-30 ENCOUNTER — HOSPITAL ENCOUNTER (EMERGENCY)
Age: 63
Discharge: HOME | End: 2018-10-30
Payer: MEDICARE

## 2018-10-30 VITALS
HEIGHT: 64 IN | WEIGHT: 145 LBS | DIASTOLIC BLOOD PRESSURE: 79 MMHG | TEMPERATURE: 98.6 F | SYSTOLIC BLOOD PRESSURE: 105 MMHG | HEART RATE: 92 BPM | RESPIRATION RATE: 16 BRPM | OXYGEN SATURATION: 100 % | BODY MASS INDEX: 24.75 KG/M2

## 2018-10-30 DIAGNOSIS — S80.211A ABRASION OF RIGHT KNEE, INITIAL ENCOUNTER: ICD-10-CM

## 2018-10-30 DIAGNOSIS — S83.91XA SPRAIN OF RIGHT KNEE, UNSPECIFIED LIGAMENT, INITIAL ENCOUNTER: Primary | ICD-10-CM

## 2018-10-30 DIAGNOSIS — S93.401A SPRAIN OF RIGHT ANKLE, UNSPECIFIED LIGAMENT, INITIAL ENCOUNTER: ICD-10-CM

## 2018-10-30 PROCEDURE — 6370000000 HC RX 637 (ALT 250 FOR IP): Performed by: PHYSICIAN ASSISTANT

## 2018-10-30 PROCEDURE — 90715 TDAP VACCINE 7 YRS/> IM: CPT | Performed by: PHYSICIAN ASSISTANT

## 2018-10-30 PROCEDURE — 90471 IMMUNIZATION ADMIN: CPT | Performed by: PHYSICIAN ASSISTANT

## 2018-10-30 PROCEDURE — 73560 X-RAY EXAM OF KNEE 1 OR 2: CPT

## 2018-10-30 PROCEDURE — 99283 EMERGENCY DEPT VISIT LOW MDM: CPT

## 2018-10-30 PROCEDURE — 73610 X-RAY EXAM OF ANKLE: CPT

## 2018-10-30 PROCEDURE — 6360000002 HC RX W HCPCS: Performed by: PHYSICIAN ASSISTANT

## 2018-10-30 RX ORDER — HYDROCODONE BITARTRATE AND ACETAMINOPHEN 5; 325 MG/1; MG/1
1 TABLET ORAL EVERY 8 HOURS PRN
Qty: 5 TABLET | Refills: 0 | Status: SHIPPED | OUTPATIENT
Start: 2018-10-30 | End: 2018-11-02

## 2018-10-30 RX ORDER — OXYCODONE HYDROCHLORIDE AND ACETAMINOPHEN 5; 325 MG/1; MG/1
1 TABLET ORAL ONCE
Status: COMPLETED | OUTPATIENT
Start: 2018-10-30 | End: 2018-10-30

## 2018-10-30 RX ADMIN — OXYCODONE HYDROCHLORIDE AND ACETAMINOPHEN 1 TABLET: 5; 325 TABLET ORAL at 17:03

## 2018-10-30 RX ADMIN — TETANUS TOXOID, REDUCED DIPHTHERIA TOXOID AND ACELLULAR PERTUSSIS VACCINE, ADSORBED 0.5 ML: 5; 2.5; 8; 8; 2.5 SUSPENSION INTRAMUSCULAR at 17:22

## 2018-10-30 ASSESSMENT — ENCOUNTER SYMPTOMS
ABDOMINAL DISTENTION: 0
ABDOMINAL PAIN: 0
STRIDOR: 0
CONSTIPATION: 0
COUGH: 0
SHORTNESS OF BREATH: 0
WHEEZING: 0
BACK PAIN: 0
VOMITING: 0
COLOR CHANGE: 0
NAUSEA: 0
DIARRHEA: 0

## 2018-10-30 ASSESSMENT — PAIN DESCRIPTION - LOCATION: LOCATION: ANKLE

## 2018-10-30 ASSESSMENT — PAIN DESCRIPTION - PAIN TYPE: TYPE: ACUTE PAIN

## 2018-10-30 ASSESSMENT — PAIN SCALES - GENERAL
PAINLEVEL_OUTOF10: 9
PAINLEVEL_OUTOF10: 9

## 2018-10-30 ASSESSMENT — PAIN DESCRIPTION - ORIENTATION: ORIENTATION: RIGHT

## 2018-10-30 NOTE — ED PROVIDER NOTES
disease) ([facility]     Coronary artery disease     stent June 2010    DDD (degenerative disc disease), lumbosacral 11/28/2012    Depression     Diabetes mellitus ([facility]     Diabetes mellitus out of control ([facility] 2004    Diabetic retinopathy ([facility] 1/3/2013    Hand pain     and feet neuroapthy from DM    Herniated lumbar disc without myelopathy     HTN (hypertension)     Hyperlipidemia LDL goal <70     Insomnia     Irritable bowel syndrome     Neuropathy     of feet    osteoporosis     Stroke (cerebrum) ([facility] Dec 11 2015    TIA 6/1/2012    Tobacco abuse     current         Procedure Laterality Date    BREAST SURGERY  03/12/12    left breast segmental mastectomy    COLONOSCOPY      CORONARY ANGIOPLASTY WITH STENT PLACEMENT      2010    CORONARY ANGIOPLASTY WITH STENT PLACEMENT  12/2014    INSERTABLE CARDIAC MONITOR      TONSILLECTOMY      TUBAL LIGATION      TUNNELED VENOUS PORT PLACEMENT  6/6/12       Medications:  Previous Medications    ALCOHOL SWABS (ALCOHOL WIPES) PADS    Use with testing three times daily    AMLODIPINE (NORVASC) 5 MG TABLET    Take 1 tablet by mouth daily    APIXABAN (ELIQUIS) 5 MG TABS TABLET    Take 1 tablet by mouth 2 times daily    ASPIRIN 81 MG EC TABLET    Take 1 tablet by mouth daily    ATORVASTATIN (LIPITOR) 40 MG TABLET    Take 1 tablet by mouth nightly    BLOOD GLUCOSE MONITORING SUPPL (BLOOD GLUCOSE SYSTEM ALAYNA) KIT    Test three times daily    ERGOCALCIFEROL (ERGOCALCIFEROL) 16274 UNITS CAPSULE    Take 1 capsule by mouth once a week    ESOMEPRAZOLE MAGNESIUM (NEXIUM) 20 MG PACK    Take 20 mg by mouth daily    GLUCOSE BLOOD (BLOOD GLUCOSE TEST STRIPS) STRP    Use as directed - test three times daily    INSULIN GLARGINE (TOUJEO SOLOSTAR) 300 UNIT/ML INJECTION PEN    Inject 10 Units into the skin nightly    INSULIN LISPRO (HUMALOG) 100 UNIT/ML INJECTION VIAL    Inject into the skin 3 times daily (before meals)    INSULIN LISPRO (HUMALOG) 100 UNIT/ML PEN    Inject 0-10 right ankle pain, family states patient also has right knee pain. Acuity: Acute Type of Exam: Initial FINDINGS: There is no acute fracture or dislocation. The ankle mortise is intact. The bones are demineralized. There are no bony destructive lesions. Mild degenerative changes involve the tibiotalar joint and midfoot. There are moderate calcaneal enthesophytes. Vascular calcifications are noted. 1. No acute abnormality. MEDICAL DECISION MAKING / ED COURSE:      PROCEDURES:   Procedures    None    Patient was given:  Medications   oxyCODONE-acetaminophen (PERCOCET) 5-325 MG per tablet 1 tablet (1 tablet Oral Given 10/30/18 1703)   Tetanus-Diphth-Acell Pertussis (BOOSTRIX) injection 0.5 mL (0.5 mLs Intramuscular Given 10/30/18 1722)       This patient presents to the emergency department with small abrasion to the right anterior knee without any palpable reproducible tenderness and right ankle pain status post mechanical fall. There was no head trauma or loss of consciousness. Her weakness is unchanged from baseline. Family is not concerned about any acute strokelike symptoms. Therefore, they do not feel head imaging is warranted at this time and I agree, given the patient's history. She is reporting that she slipped and fell in the bath. Motor and sensory function are baseline and unchanged. X-rays are negative for any acute osseous abnormality. Differentials include but not limited to strain, sprain, arthritis, bursitis, tendinitis, contusion, spasm. Abrasion cleaned by nurse. Ace wrap applied to both ankle and knee. Tetanus was updated. She is advised to follow up with PCP and orthopedist for recheck and may return to ED per discharge instructions. She is able to ambulate with her cane and son feels comfortable taking her home and will assist her further as needed. She will be sent home with short course of pain medicine.  My suspicion is low for acute

## 2018-11-15 RX ORDER — ATORVASTATIN CALCIUM 40 MG/1
40 TABLET, FILM COATED ORAL NIGHTLY
Qty: 30 TABLET | Refills: 1 | Status: SHIPPED | OUTPATIENT
Start: 2018-11-15 | End: 2019-03-13 | Stop reason: SDUPTHER

## 2018-11-15 RX ORDER — AMLODIPINE BESYLATE 5 MG/1
5 TABLET ORAL DAILY
Qty: 30 TABLET | Refills: 1 | Status: ON HOLD | OUTPATIENT
Start: 2018-11-15 | End: 2018-11-24 | Stop reason: HOSPADM

## 2018-11-21 ENCOUNTER — HOSPITAL ENCOUNTER (INPATIENT)
Age: 63
LOS: 3 days | Discharge: HOME HEALTH | DRG: 378 | End: 2018-11-24
Attending: EMERGENCY MEDICINE | Admitting: INTERNAL MEDICINE
Payer: MEDICARE

## 2018-11-21 ENCOUNTER — TELEPHONE (OUTPATIENT)
Dept: CARDIOLOGY | Age: 63
End: 2018-11-21

## 2018-11-21 DIAGNOSIS — Z86.79 HISTORY OF ATRIAL FIBRILLATION: ICD-10-CM

## 2018-11-21 DIAGNOSIS — K92.2 GASTROINTESTINAL HEMORRHAGE, UNSPECIFIED GASTROINTESTINAL HEMORRHAGE TYPE: Primary | ICD-10-CM

## 2018-11-21 DIAGNOSIS — Z86.73 HISTORY OF CVA (CEREBROVASCULAR ACCIDENT): ICD-10-CM

## 2018-11-21 DIAGNOSIS — Z79.01 ADEQUATE ANTICOAGULATION ON ANTICOAGULANT THERAPY: ICD-10-CM

## 2018-11-21 LAB
A/G RATIO: 0.8 (ref 1.1–2.2)
ABO/RH: NORMAL
ALBUMIN SERPL-MCNC: 3.3 G/DL (ref 3.4–5)
ALP BLD-CCNC: 100 U/L (ref 40–129)
ALT SERPL-CCNC: 11 U/L (ref 10–40)
ANION GAP SERPL CALCULATED.3IONS-SCNC: 11 MMOL/L (ref 3–16)
ANTIBODY SCREEN: NORMAL
AST SERPL-CCNC: 16 U/L (ref 15–37)
BASOPHILS ABSOLUTE: 0.1 K/UL (ref 0–0.2)
BASOPHILS RELATIVE PERCENT: 1.1 %
BILIRUB SERPL-MCNC: <0.2 MG/DL (ref 0–1)
BUN BLDV-MCNC: 12 MG/DL (ref 7–20)
CALCIUM SERPL-MCNC: 9.3 MG/DL (ref 8.3–10.6)
CHLORIDE BLD-SCNC: 102 MMOL/L (ref 99–110)
CO2: 25 MMOL/L (ref 21–32)
CREAT SERPL-MCNC: 1.5 MG/DL (ref 0.6–1.2)
EOSINOPHILS ABSOLUTE: 0.2 K/UL (ref 0–0.6)
EOSINOPHILS RELATIVE PERCENT: 1.9 %
GFR AFRICAN AMERICAN: 42
GFR NON-AFRICAN AMERICAN: 35
GLOBULIN: 4 G/DL
GLUCOSE BLD-MCNC: 150 MG/DL (ref 70–99)
HCT VFR BLD CALC: 27.7 % (ref 36–48)
HEMOGLOBIN: 9 G/DL (ref 12–16)
INR BLD: 1.26 (ref 0.86–1.14)
LYMPHOCYTES ABSOLUTE: 1.1 K/UL (ref 1–5.1)
LYMPHOCYTES RELATIVE PERCENT: 13.1 %
MCH RBC QN AUTO: 30.2 PG (ref 26–34)
MCHC RBC AUTO-ENTMCNC: 32.4 G/DL (ref 31–36)
MCV RBC AUTO: 93.3 FL (ref 80–100)
MONOCYTES ABSOLUTE: 0.6 K/UL (ref 0–1.3)
MONOCYTES RELATIVE PERCENT: 7.1 %
NEUTROPHILS ABSOLUTE: 6.5 K/UL (ref 1.7–7.7)
NEUTROPHILS RELATIVE PERCENT: 76.8 %
OCCULT BLOOD DIAGNOSTIC: ABNORMAL
PDW BLD-RTO: 14.8 % (ref 12.4–15.4)
PLATELET # BLD: 265 K/UL (ref 135–450)
PMV BLD AUTO: 8.5 FL (ref 5–10.5)
POTASSIUM SERPL-SCNC: 4 MMOL/L (ref 3.5–5.1)
PROTHROMBIN TIME: 14.4 SEC (ref 9.8–13)
RBC # BLD: 2.97 M/UL (ref 4–5.2)
SODIUM BLD-SCNC: 138 MMOL/L (ref 136–145)
TOTAL PROTEIN: 7.3 G/DL (ref 6.4–8.2)
WBC # BLD: 8.5 K/UL (ref 4–11)

## 2018-11-21 PROCEDURE — C9113 INJ PANTOPRAZOLE SODIUM, VIA: HCPCS | Performed by: PHYSICIAN ASSISTANT

## 2018-11-21 PROCEDURE — 6360000002 HC RX W HCPCS: Performed by: PHYSICIAN ASSISTANT

## 2018-11-21 PROCEDURE — 85025 COMPLETE CBC W/AUTO DIFF WBC: CPT

## 2018-11-21 PROCEDURE — 2060000000 HC ICU INTERMEDIATE R&B

## 2018-11-21 PROCEDURE — 86850 RBC ANTIBODY SCREEN: CPT

## 2018-11-21 PROCEDURE — 86901 BLOOD TYPING SEROLOGIC RH(D): CPT

## 2018-11-21 PROCEDURE — 86900 BLOOD TYPING SEROLOGIC ABO: CPT

## 2018-11-21 PROCEDURE — 85610 PROTHROMBIN TIME: CPT

## 2018-11-21 PROCEDURE — G0328 FECAL BLOOD SCRN IMMUNOASSAY: HCPCS

## 2018-11-21 PROCEDURE — 99284 EMERGENCY DEPT VISIT MOD MDM: CPT

## 2018-11-21 PROCEDURE — 2580000003 HC RX 258: Performed by: PHYSICIAN ASSISTANT

## 2018-11-21 PROCEDURE — 96365 THER/PROPH/DIAG IV INF INIT: CPT

## 2018-11-21 PROCEDURE — 80053 COMPREHEN METABOLIC PANEL: CPT

## 2018-11-21 RX ORDER — 0.9 % SODIUM CHLORIDE 0.9 %
10 VIAL (ML) INJECTION EVERY 12 HOURS
Status: DISCONTINUED | OUTPATIENT
Start: 2018-11-21 | End: 2018-11-24 | Stop reason: HOSPADM

## 2018-11-21 RX ORDER — SODIUM CHLORIDE 0.9 % (FLUSH) 0.9 %
10 SYRINGE (ML) INJECTION PRN
Status: DISCONTINUED | OUTPATIENT
Start: 2018-11-21 | End: 2018-11-24 | Stop reason: HOSPADM

## 2018-11-21 RX ORDER — PANTOPRAZOLE SODIUM 40 MG/10ML
40 INJECTION, POWDER, LYOPHILIZED, FOR SOLUTION INTRAVENOUS EVERY 12 HOURS
Status: DISCONTINUED | OUTPATIENT
Start: 2018-11-22 | End: 2018-11-24 | Stop reason: HOSPADM

## 2018-11-21 RX ORDER — ONDANSETRON 2 MG/ML
4 INJECTION INTRAMUSCULAR; INTRAVENOUS EVERY 6 HOURS PRN
Status: DISCONTINUED | OUTPATIENT
Start: 2018-11-21 | End: 2018-11-24 | Stop reason: HOSPADM

## 2018-11-21 RX ORDER — SODIUM CHLORIDE 0.9 % (FLUSH) 0.9 %
10 SYRINGE (ML) INJECTION EVERY 12 HOURS SCHEDULED
Status: DISCONTINUED | OUTPATIENT
Start: 2018-11-21 | End: 2018-11-24 | Stop reason: HOSPADM

## 2018-11-21 RX ADMIN — SODIUM CHLORIDE 80 MG: 9 INJECTION, SOLUTION INTRAVENOUS at 19:48

## 2018-11-21 ASSESSMENT — ENCOUNTER SYMPTOMS
NAUSEA: 0
CHEST TIGHTNESS: 0
VOMITING: 0
ABDOMINAL PAIN: 0
SHORTNESS OF BREATH: 0
DIARRHEA: 0
COLOR CHANGE: 0

## 2018-11-21 ASSESSMENT — PAIN SCALES - GENERAL: PAINLEVEL_OUTOF10: 0

## 2018-11-21 NOTE — ED PROVIDER NOTES
[address] [facility]  eMERGENCY dEPARTMENT eNCOUnter        Pt Name: Jocy Dumont  MRN: [id number]  [place] 1955  Date of evaluation: 11/21/2018  Provider: Karma London PA-C  PCP: Tessy Espinal    This patient was seen and evaluated by the attending physician No att. providers found. CHIEF COMPLAINT       Chief Complaint   Patient presents with    Abnormal Test Results     pt sent in by PCP c/o pts hemoglobin dropped from 11 to 8.3 in a month. Denies any pain or bleeding       HISTORY OF PRESENT ILLNESS   (Location/Symptom, Timing/Onset, Context/Setting, Quality, Duration, Modifying Factors, Severity)  Note limiting factors. Jocy Dumont is a 58 y.o. female who presents to the emergency department at the request of the patient's primary care provider Dr. Maria T Davis.  Patient has a past medical history significant for paroxysmal atrial fibrillation and multiple CVAs where she is left with some right sided hemiparesis. Patient is anticoagulated with Eliquis because of the above-mentioned. It is reported that she has had difficulties with possible dark stools and concerns for the possibility of blood loss related anemia. She had been told that she had low blood counts today and was asked to present to the emergency department for evaluation and treatment. Patient states she is essentially without complaint at present. She does have some generalized weakness but states that that is not a new finding for her. It is reported that she has had a interval drop in her hemoglobin from 12-8.3 documented on laboratory testing was performed. In review of the patient's chart both by the office visit by her primary care provider as well as Dr. Lanny Mckee from cardiology there was some discussion about discontinuation of the Eliquis but the risk seemed rather high in regards to the above-mentioned.   At the present time the patient denies that she is experiencing symptoms related to chest pain, palpitations, lightheadedness, shortness of breath. She denies hematuria or flank pain. She denies dysuria, urgency, frequency. She does report that she has some wounds on her right leg that are delaying healing. She states that she has not had difficulties with excessive bruising or any active bleeding. She states that with the exception of some possible change in stool color there is been nothing new. Patient states she had a colonoscopy which is performed within the last 10 years but she cannot tell me who wore when that might of been. It is reportedly normal at the time. Nursing Notes were all reviewed and agreed with or any disagreements were addressed  in the HPI. REVIEW OF SYSTEMS    (2-9 systems for level 4, 10 or more for level 5)     Review of Systems   Constitutional: Positive for fatigue. Negative for activity change, chills and fever. Respiratory: Negative for chest tightness and shortness of breath. Cardiovascular: Negative for chest pain. Gastrointestinal: Negative for abdominal pain, diarrhea, nausea and vomiting. Genitourinary: Negative for dysuria, flank pain and hematuria. Skin: Positive for wound. Negative for color change and rash. Neurological: Positive for weakness (Right-sided from CVA). Negative for syncope, numbness and headaches. Positives and Pertinent negatives as per HPI. Except as noted abovein the ROS, all other systems were reviewed and negative.        PAST MEDICAL HISTORY     Past Medical History:   Diagnosis Date    Allergic rhinitis due to pollen     Arthritis     Breast cancer ([address]     left     Breast cancer, left breast ([address] 2/27/2012    chemo and radiation    Bronchitis, mucopurulent recurrent ([address]     Cerebrovascular disease     CVA x 4, last mid August 2016    COPD (chronic obstructive pulmonary disease) ([address]     Coronary artery disease     stent June 2010    DDD (degenerative disc disease), lumbosacral 11/28/2012    Depression     Diabetes mellitus [facility]     Diabetes mellitus out of control ([facility] 2004    Diabetic retinopathy ([facility] 1/3/2013    Hand pain     and feet neuroapthy from DM    Herniated lumbar disc without myelopathy     HTN (hypertension)     Hyperlipidemia LDL goal <70     Insomnia     Irritable bowel syndrome     Neuropathy     of feet    osteoporosis     Stroke (cerebrum) ([facility] Dec 11 2015    TIA 6/1/2012    Tobacco abuse     current         SURGICAL HISTORY     Past Surgical History:   Procedure Laterality Date    BREAST SURGERY  03/12/12    left breast segmental mastectomy    COLONOSCOPY      CORONARY ANGIOPLASTY WITH STENT PLACEMENT      2010    CORONARY ANGIOPLASTY WITH STENT PLACEMENT  12/2014    INSERTABLE CARDIAC MONITOR      TONSILLECTOMY      TUBAL LIGATION      TUNNELED VENOUS PORT PLACEMENT  6/6/12         CURRENTMEDICATIONS       Previous Medications    ALCOHOL SWABS (ALCOHOL WIPES) PADS    Use with testing three times daily    AMLODIPINE (NORVASC) 5 MG TABLET    Take 1 tablet by mouth daily    APIXABAN (ELIQUIS) 5 MG TABS TABLET    Take 1 tablet by mouth 2 times daily    ASPIRIN 81 MG EC TABLET    Take 1 tablet by mouth daily    ATORVASTATIN (LIPITOR) 40 MG TABLET    Take 1 tablet by mouth nightly    BLOOD GLUCOSE MONITORING SUPPL (BLOOD GLUCOSE SYSTEM ALAYNA) KIT    Test three times daily    ERGOCALCIFEROL (ERGOCALCIFEROL) 44244 UNITS CAPSULE    Take 1 capsule by mouth once a week    ESOMEPRAZOLE MAGNESIUM (NEXIUM) 20 MG PACK    Take 20 mg by mouth daily    GLUCOSE BLOOD (BLOOD GLUCOSE TEST STRIPS) STRP    Use as directed - test three times daily    INSULIN GLARGINE (TOUJEO SOLOSTAR) 300 UNIT/ML INJECTION PEN    Inject 10 Units into the skin nightly    INSULIN LISPRO (HUMALOG) 100 UNIT/ML INJECTION VIAL    Inject into the skin 3 times daily (before meals)    INSULIN LISPRO (HUMALOG) 100 UNIT/ML PEN    Inject 0-10 Units into the skin 3 times daily (before meals) Less than 130 - no insulin; 130-150 = 5 units, greater than 150 = 10 units    LANCETS MISC    Use for each blood sugar test         ALLERGIES     Lyrica [pregabalin] and Metformin    FAMILYHISTORY       Family History   Problem Relation Age of Onset    Heart Disease Mother     Diabetes Mother     Diabetes Father     Cancer Maternal Grandmother         Breast          SOCIAL HISTORY       Social History     Social History    Marital status:      Spouse name: Barbie Huynh Number of children: 2    Years of education: N/A     Occupational History    imagining technician [employer]     Social History Main Topics    Smoking status: Current Some Day Smoker     Packs/day: 1.00     Years: 40.00     Types: Cigarettes     Last attempt to quit: 8/15/2016    Smokeless tobacco: Never Used    Alcohol use No    Drug use: No    Sexual activity: Not Currently      Comment:  from  but not legal separation. Other Topics Concern    None     Social History Narrative    Lives with son. SCREENINGS             PHYSICAL EXAM    (up to 7 for level 4, 8 or more for level 5)     ED Triage Vitals [11/21/18 1731]   BP Temp Temp Source Pulse Resp SpO2 Height Weight   121/75 98.3 °F (36.8 °C) Oral 89 16 100 % 5' 4\" (1.626 m) 148 lb (67.1 kg)       Physical Exam   Constitutional: She is oriented to person, place, and time. She appears well-developed and well-nourished. No distress. HENT:   Head: Normocephalic and atraumatic. Right Ear: External ear normal.   Left Ear: External ear normal.   Eyes: Conjunctivae are normal. Right eye exhibits no discharge. Left eye exhibits no discharge. No scleral icterus. Neck: Normal range of motion. No JVD present. Cardiovascular: Normal rate and regular rhythm. Exam reveals no gallop and no friction rub. No murmur heard. Pulmonary/Chest: Effort normal and breath sounds normal. No accessory muscle usage. No respiratory distress.  She has no sodium chloride 0.9 % 50 mL bolus (80 mg Intravenous New Bag 11/21/18 1948)       The patient's detailed history of present illness is documented as above. Upon arrival to the emergency department the patient's vital signs are as documented. The patient is noted to be hemodynamically stable and afebrile. Physical examination findings are as above. Patient was initially evaluated by provider in triage. Imaging, blood work and medication if warranted may have been ordered prior to my evaluation of the patient. Please see details in the chart. Patient may have received medication prior to my evaluation which may change the physical examination and will be documented as such. IV access was obtained. Laboratory testing a workup was initiated. I reviewed the telephone conversation from the patient's primary care physician to Dr. Cindy Mcdowell today in regards to the high risk nature of discontinuation of the Eliquis because of her history of CVA ×4 as well as atrial fibrillation. I've also revealed that the patient had been Hemoccult negative in the past.  This was repeated today and she was noted to be positive with dark stool but no active rectal bleeding. EKG today demonstrates a sinus rhythm with a rate of 89. No evidence of acute ST elevation. Please see attending physician details for further EKG interpretation on this patient. The remainder the laboratory tests are as above. CBC demonstrates no evidence of leukocytosis. Today her hemoglobin is 9.0 hematocrit is 27.7. There is no evidence of thrombocytopenia and a thrombus or ptosis. BUN is 12 creatinine is 1.5. Nonfasting glucose is 150 in her left lites and LFTs are otherwise unremarkable. INR is 1.26. At the present time with a hemoglobin of 9 despite being anticoagulated in consultation with the attending physician we did not believe that the patient required transfusion at the present time.  patient will require ongoing care and management on an inpatient basis to find the source of the bleeding and to further risk stratify novel anticoagulant use in a patient who has significant risk. Case was discussed directly with Dr. Zainab White who accepts the patient for admission for ongoing care management the diagnoses below. FINAL IMPRESSION      1. Gastrointestinal hemorrhage, unspecified gastrointestinal hemorrhage type    2. Adequate anticoagulation on anticoagulant therapy    3. History of multiple CVAs (cerebrovascular accident)    4.  History of atrial fibrillation          DISPOSITION/PLAN   DISPOSITION: Admit medical surgical services          (Please note that portions ofthis note were completed with a voice recognition program.  Efforts were made to edit the dictations but occasionally words are mis-transcribed.)    Willie Medina PA-C (electronically signed)           Roro Varela PA-C  11/21/18 2003

## 2018-11-21 NOTE — ED PROVIDER NOTES
PROVIDER IN [facility] [address]  eMERGENCY dEPARTMENT eNCOUnter      Pt Name: Mary Locke  MRN: [id number]  Date of evaluation: 11/21/2018      Chief Complaint:    Chief Complaint   Patient presents with    Abnormal Test Results     pt sent in by PCP c/o pts hemoglobin dropped from 11 to 8.3 in a month. Denies any pain or bleeding       HPI:  This is a  58 y.o. female who presents to the emergency department with concern of decreased hemoglobin over the past one month. Patient is on Eloquis for atrial fib, And was at the cardiology appointment today getting routine blood work. Patient and has been received a telephone call at home requesting that he come to the emergency department for evaluation and treatment of decreasing hemoglobin level. Patient denies any black tarry or bloody stools. Denies any headache, fever, lightheadedness, dizziness, visual disturbances. No chest pain or pressure. No neck or back pain. No shortness of breath, cough, or congestion. No abdominal pain, nausea, vomiting, diarrhea, constipation, or dysuria. No rash. Physical Exam: (Pertinent Negatives and Positives)  ED Triage Vitals   BP Temp Temp src Pulse Resp SpO2 Height Weight   -- -- -- -- -- -- -- --     Patient's awake, alert, oriented ×4. Speech clear and appropriate. Heart regular rate. Lungs clear to auscultation. Abdomen soft nontender. PLAN:  LABS:   Labs Reviewed   CBC WITH AUTO DIFFERENTIAL   COMPREHENSIVE METABOLIC PANEL   PROTIME-INR   BLOOD OCCULT STOOL DIAGNOSTIC   URINE RT REFLEX TO CULTURE   TYPE AND SCREEN     Radiology Studies:   No orders to display       Briefly, this is a 59-year-old female with history of atrial fibrillation and on Eloquis with declining hemoglobin level over the past one month. Patient was sent to the emergency department for definitive management and treatment. We'll recheck labs as well as Hemoccult today.   She'll be dispositioned as appropriate. Patient was seen by me in triage.  Please see the full history, physical and final disposition note by the other provider in main emergency department    (Please note sections of this note were completed with a voice recognition program.  Efforts were made to edit the dictations but occasionally words are mis-transcribed.)    Electronically signed, GREGOR Wright CNP,          GREGOR Wright CNP  11/21/18 5993

## 2018-11-22 LAB
BILIRUBIN URINE: NEGATIVE
BLOOD, URINE: ABNORMAL
CLARITY: CLEAR
COLOR: YELLOW
EPITHELIAL CELLS, UA: 1 /HPF (ref 0–5)
GLUCOSE BLD-MCNC: 171 MG/DL (ref 70–99)
GLUCOSE BLD-MCNC: 72 MG/DL (ref 70–99)
GLUCOSE BLD-MCNC: 82 MG/DL (ref 70–99)
GLUCOSE BLD-MCNC: 88 MG/DL (ref 70–99)
GLUCOSE BLD-MCNC: 92 MG/DL (ref 70–99)
GLUCOSE URINE: NEGATIVE MG/DL
HCT VFR BLD CALC: 24.7 % (ref 36–48)
HCT VFR BLD CALC: 25.3 % (ref 36–48)
HCT VFR BLD CALC: 26 % (ref 36–48)
HEMOGLOBIN: 8.2 G/DL (ref 12–16)
HEMOGLOBIN: 8.3 G/DL (ref 12–16)
HEMOGLOBIN: 8.4 G/DL (ref 12–16)
HYALINE CASTS: 1 /LPF (ref 0–8)
KETONES, URINE: NEGATIVE MG/DL
LEUKOCYTE ESTERASE, URINE: NEGATIVE
MICROSCOPIC EXAMINATION: YES
NITRITE, URINE: NEGATIVE
PERFORMED ON: ABNORMAL
PERFORMED ON: NORMAL
PH UA: 6
PROTEIN UA: 100 MG/DL
RBC UA: 2 /HPF (ref 0–4)
SPECIFIC GRAVITY UA: 1.01
URINE REFLEX TO CULTURE: ABNORMAL
URINE TYPE: ABNORMAL
UROBILINOGEN, URINE: 0.2 E.U./DL
WBC UA: 0 /HPF (ref 0–5)

## 2018-11-22 PROCEDURE — 2580000003 HC RX 258: Performed by: INTERNAL MEDICINE

## 2018-11-22 PROCEDURE — 6370000000 HC RX 637 (ALT 250 FOR IP): Performed by: PHYSICIAN ASSISTANT

## 2018-11-22 PROCEDURE — 6370000000 HC RX 637 (ALT 250 FOR IP): Performed by: INTERNAL MEDICINE

## 2018-11-22 PROCEDURE — 81001 URINALYSIS AUTO W/SCOPE: CPT

## 2018-11-22 PROCEDURE — C9113 INJ PANTOPRAZOLE SODIUM, VIA: HCPCS | Performed by: INTERNAL MEDICINE

## 2018-11-22 PROCEDURE — 2580000003 HC RX 258: Performed by: PHYSICIAN ASSISTANT

## 2018-11-22 PROCEDURE — 6360000002 HC RX W HCPCS: Performed by: INTERNAL MEDICINE

## 2018-11-22 PROCEDURE — 2060000000 HC ICU INTERMEDIATE R&B

## 2018-11-22 PROCEDURE — 36415 COLL VENOUS BLD VENIPUNCTURE: CPT

## 2018-11-22 PROCEDURE — 85014 HEMATOCRIT: CPT

## 2018-11-22 PROCEDURE — 99222 1ST HOSP IP/OBS MODERATE 55: CPT | Performed by: INTERNAL MEDICINE

## 2018-11-22 PROCEDURE — 85018 HEMOGLOBIN: CPT

## 2018-11-22 RX ORDER — NICOTINE POLACRILEX 4 MG
15 LOZENGE BUCCAL PRN
Status: DISCONTINUED | OUTPATIENT
Start: 2018-11-22 | End: 2018-11-24 | Stop reason: HOSPADM

## 2018-11-22 RX ORDER — TRAZODONE HYDROCHLORIDE 50 MG/1
25 TABLET ORAL NIGHTLY
COMMUNITY

## 2018-11-22 RX ORDER — DEXTROSE MONOHYDRATE 50 MG/ML
100 INJECTION, SOLUTION INTRAVENOUS PRN
Status: DISCONTINUED | OUTPATIENT
Start: 2018-11-22 | End: 2018-11-24 | Stop reason: HOSPADM

## 2018-11-22 RX ORDER — DEXTROSE MONOHYDRATE 25 G/50ML
12.5 INJECTION, SOLUTION INTRAVENOUS PRN
Status: DISCONTINUED | OUTPATIENT
Start: 2018-11-22 | End: 2018-11-24 | Stop reason: HOSPADM

## 2018-11-22 RX ORDER — ATORVASTATIN CALCIUM 40 MG/1
40 TABLET, FILM COATED ORAL NIGHTLY
Status: DISCONTINUED | OUTPATIENT
Start: 2018-11-22 | End: 2018-11-24 | Stop reason: HOSPADM

## 2018-11-22 RX ORDER — SODIUM CHLORIDE 9 MG/ML
INJECTION, SOLUTION INTRAVENOUS CONTINUOUS
Status: DISCONTINUED | OUTPATIENT
Start: 2018-11-22 | End: 2018-11-23

## 2018-11-22 RX ADMIN — Medication 10 ML: at 06:56

## 2018-11-22 RX ADMIN — DESMOPRESSIN ACETATE 40 MG: 0.2 TABLET ORAL at 21:17

## 2018-11-22 RX ADMIN — PANTOPRAZOLE SODIUM 40 MG: 40 INJECTION, POWDER, FOR SOLUTION INTRAVENOUS at 06:56

## 2018-11-22 RX ADMIN — Medication 10 ML: at 21:19

## 2018-11-22 RX ADMIN — Medication 10 ML: at 10:31

## 2018-11-22 RX ADMIN — INSULIN LISPRO 1 UNITS: 100 INJECTION, SOLUTION INTRAVENOUS; SUBCUTANEOUS at 13:31

## 2018-11-22 RX ADMIN — SODIUM CHLORIDE: 9 INJECTION, SOLUTION INTRAVENOUS at 13:31

## 2018-11-22 RX ADMIN — PANTOPRAZOLE SODIUM 40 MG: 40 INJECTION, POWDER, FOR SOLUTION INTRAVENOUS at 21:17

## 2018-11-22 RX ADMIN — Medication 10 ML: at 21:18

## 2018-11-22 ASSESSMENT — PAIN SCALES - GENERAL
PAINLEVEL_OUTOF10: 0

## 2018-11-22 NOTE — ED NOTES
Report called to Hussein Carter RN verbalized understanding and denied any need for further information, patient visible on tele monitor on unit, patient to be transported to unit at this time      Michael Mercer RN  11/21/18 2043

## 2018-11-22 NOTE — CONSULTS
[address] [place] [address]  [phone]        Reason for Consultation/Chief Complaint: \"I was told by doctor to come to hospital.\" Consulted for anticoagulation recommendations    History of Present Illness:  Juan Antonio Talbert is a 58 y.o. patient who presented to [facility] 11/21/18 due to recommendation from PCP due to anemia and no specific complaint. She has PMH of PAF, multiple CVA x 4, hx left breast cancer, DJD, COPD, DM, HTN, HLD, CADs/p stent 2014, and s/p loop recorder 2016. Note in October 2018 due to CHADS-vasc=6 and hx PAF/CVA she was placed on eliquis at Dr. Alyssa Hernandez recommendation. Apparently, no anticoagulation prior. She was found to have decrease in H/H from 11.7/35.5 in October 2018 to 9/27 now with dark-colored stools. In ER found to be guiac + for blood. Note reported EKG NSR (copy not in EMR yet). Note most recent carotid doppler 10/11/18 showed < 50% stenoses bilaterally. Note most recent ECHO 10/11/18 showed EF=65%; mild-mod MR; trivial TR; normal diastolic function. Patient with no complaints of chest pain, SOB, palpitations, dizziness, edema, or orthopnea/PND. I have been asked to provide consultation regarding further management and testing. Past Medical History:   has a past medical history of Allergic rhinitis due to pollen; Arthritis; Breast cancer ([address] Breast cancer, left breast ([address] Bronchitis, mucopurulent recurrent ([address] Cerebrovascular disease; COPD (chronic obstructive pulmonary disease) ([address] Coronary artery disease; DDD (degenerative disc disease), lumbosacral; Depression; Diabetes mellitus ([address] Diabetes mellitus out of control ([address] Diabetic retinopathy ([address] Hand pain; Herniated lumbar disc without myelopathy; HTN (hypertension); Hyperlipidemia LDL goal <70; Insomnia; Irritable bowel syndrome; Neuropathy; osteoporosis; Stroke (cerebrum) ([address] TIA; and Tobacco abuse.     Surgical History:   has a past surgical history that includes Tonsillectomy; Tubal ligation; Coronary angioplasty with stent; Breast surgery (03/12/12); Colonoscopy; Tunneled venous port placement (6/6/12); Coronary angioplasty with stent (12/2014); and Insertable Cardiac Monitor. Social History:   reports that she has been smoking Cigarettes. She has a 40.00 pack-year smoking history. She has never used smokeless tobacco. She reports that she does not drink alcohol or use drugs. Family History:  family history includes Cancer in her maternal grandmother; Diabetes in her father and mother; Heart Disease in her mother. Home Medications:  Were reviewed and are listed in nursing record. and/or listed below  Prior to Admission medications    Medication Sig Start Date End Date Taking?  Authorizing Provider   traZODone (DESYREL) 50 MG tablet Take 50 mg by mouth nightly   Yes Historical Provider, MD   apixaban (ELIQUIS) 5 MG TABS tablet Take 1 tablet by mouth 2 times daily 11/15/18  Yes Jose Maria Crump MD   atorvastatin (LIPITOR) 40 MG tablet Take 1 tablet by mouth nightly 11/15/18  Yes Jose Maria Crump MD   amLODIPine (NORVASC) 5 MG tablet Take 1 tablet by mouth daily 11/15/18  Yes Jose Maria Crump MD   aspirin 81 MG EC tablet Take 1 tablet by mouth daily 10/12/18  Yes Janae Rondon PA-C   insulin lispro (HUMALOG) 100 UNIT/ML injection vial Inject into the skin 3 times daily (before meals)   Yes Historical Provider, MD   insulin glargine (TOUJEO SOLOSTAR) 300 UNIT/ML injection pen Inject 10 Units into the skin nightly 7/17/17  Yes Ludger Cooks, MD   insulin lispro (HUMALOG) 100 UNIT/ML pen Inject 0-10 Units into the skin 3 times daily (before meals) Less than 130 - no insulin; 130-150 = 5 units, greater than 150 = 10 units 7/17/17 8/16/17  Ludger Cooks, MD   ergocalciferol (ERGOCALCIFEROL) 32092 units capsule Take 1 capsule by mouth once a week  Patient taking differently: Take 50,000 Units by mouth once a week Take every Friday 6/15/17   Ludger Cooks, MD   Glucose Blood (BLOOD GLUCOSE TEST STRIPS) STRP Use as directed - test three times daily 11/2/16   Bethany Reyna MD   Blood Glucose Monitoring Suppl (BLOOD GLUCOSE SYSTEM ALAYNA) KIT Test three times daily 11/2/16   Bethany Reyna MD   Lancets MISC Use for each blood sugar test 11/2/16   Bethany Reyna MD   Alcohol Swabs (ALCOHOL WIPES) PADS Use with testing three times daily 11/2/16   Bethany Reyna MD        Allergies:  Lyrica [pregabalin] and Metformin     Review of Systems:   All 14 point review of symptoms completed. Pertinent positives identified in the HPI, all other review of symptoms negative as below.     Physical Examination:    Vitals:    11/22/18 1015   BP: 128/76   Pulse: 80   Resp: 18   Temp:    SpO2: 98%    Weight: 141 lb 11.2 oz (64.3 kg)         General Appearance:  Alert, cooperative, no distress, appears stated age   Head:  Normocephalic, without obvious abnormality, atraumatic   Eyes:  PERRL, conjunctiva/corneas clear       Nose: Nares normal, no drainage or sinus tenderness   Throat: Lips, mucosa, and tongue normal   Neck: Supple, symmetrical, trachea midline, no adenopathy, thyroid: not enlarged, symmetric, no tenderness/mass/nodules, no carotid bruit or JVD       Lungs:   Clear to auscultation bilaterally, respirations unlabored   Chest Wall:  No tenderness or deformity   Heart:  Regular rate and rhythm, S1, S2 normal, no rub or gallop; +soft BO   Abdomen:   Soft, non-tender, bowel sounds active all four quadrants,  no masses, no organomegaly           Extremities: Extremities normal, atraumatic, no cyanosis or edema   Pulses: 2+ and symmetric   Skin: Skin color, texture, turgor normal, no rashes or lesions   Pysch: Normal mood and affect   Neurologic: Normal gross motor and sensory exam.         Labs  CBC:   Lab Results   Component Value Date    WBC 8.5 11/21/2018    RBC 2.97 11/21/2018    HGB 8.2 11/22/2018    HCT 25.3 11/22/2018    MCV 93.3 11/21/2018    RDW 14.8 11/21/2018     11/21/2018     CMP:    Lab Results   Component Value Date     11/21/2018    K 4.0 11/21/2018     11/21/2018    CO2 25 11/21/2018    BUN 12 11/21/2018    CREATININE 1.5 11/21/2018    GFRAA 42 11/21/2018    GFRAA >60 12/02/2012    AGRATIO 0.8 11/21/2018    LABGLOM 35 11/21/2018    GLUCOSE 150 11/21/2018    PROT 7.3 11/21/2018    PROT 7.7 12/02/2012    CALCIUM 9.3 11/21/2018    BILITOT <0.2 11/21/2018    ALKPHOS 100 11/21/2018    AST 16 11/21/2018    ALT 11 11/21/2018     PT/INR:  No results found for: PTINR  Lab Results   Component Value Date    CKTOTAL 109 08/08/2010    CKMB 1.39 05/15/2010    TROPONINI <0.01 10/09/2018       EKG:  I have reviewed EKG with the following interpretation:  Impression:  See HPI    10/11/18 ECHO Summary   -Normal left ventricle size, wall thickness and systolic function with an   estimated ejection fraction of 65%. No regional wall motion abnormalities are seen.   -Normal diastolic function.   -Mild to moderate mitral regurgitation.   -Trivial tricuspid regurgitation. Carotid doppler 10/11/18: <50% stenoses bilaterall    Assessment:  Bony Ayala is a 58 y.o. patient who presented to [facility] 11/21/18 due to recommendation from PCP due to anemia and no specific complaint. She has PMH of PAF, multiple CVA x 4, hx left breast cancer, DJD, COPD, DM, HTN, HLD, CADs/p stent 2014, and s/p loop recorder 2016. Note in October 2018 due to CHADS-vasc=6 and hx PAF/CVA she was placed on eliquis at Dr. Jeromy Cyr recommendation. Apparently, no anticoagulation prior. She was found to have decrease in H/H from 11.7/35.5 in October 2018 to 9/27 now with dark-colored stools. In ER found to be guiac + for blood. Note reported EKG NSR (copy not in EMR yet). Note most recent carotid doppler 10/11/18 showed < 50% stenoses bilaterally. Note most recent ECHO 10/11/18 showed EF=65%; mild-mod MR; trivial TR; normal diastolic function.     Diagnosis of anemia and possible UGIB in elderly female on eliquis and baby aspirin for hx CAD and PAF. Her anemia has worsened since starting eliquis in October. Bleeding source not yet known? Recs:  1. Hold all anticoagulation until GI w/u complete. 2. Clear liquids today with plan for endoscopy per GI doc tomorrow. 3. Follow telemetry. 4. Consider Watchman LA appendage occluder device after GI w/u complete and d/w EP partner. 5. No need for cardiac testing at this time.      Patient Active Problem List   Diagnosis    Neuropathy    Mixed hyperlipidemia    Uncontrolled type 2 diabetes mellitus with hyperglycemia, with long-term current use of insulin ([address]    Coronary artery disease    Smoking    Breast cancer, left breast ([address]    DDD (degenerative disc disease), lumbosacral    Diabetic peripheral neuropathy (HCC)    Diabetic retinopathy ([address]    COPD (chronic obstructive pulmonary disease) (HCC)    Arthritis    Ataxia    GERD (gastroesophageal reflux disease)    Diabetic polyneuropathy (HCC)    Dermatophytosis of nail    Precordial pain    Weakness of right leg    Cerebrovascular accident (CVA) due to occlusion of cerebral artery ([address]    Essential hypertension    Severe mitral regurgitation    Coronary artery disease involving native coronary artery of native heart without angina pectoris    Cerebrovascular accident (CVA) ([address]    Generalized osteoarthritis    Vitamin D deficiency    Dysphagia due to recent stroke    Dysarthria    Paroxysmal atrial fibrillation ([facility])    Hypokalemia    Encounter for electronic analysis of reveal event recorder    Arterial ischemic stroke, ICA, left, acute ([address]    DM (diabetes mellitus), secondary, uncontrolled, w/neurologic complic ([address]    HTN (hypertension), benign    Paresis ([address]    Diabetic ulcer of left foot associated with type 2 diabetes mellitus ([address]    Decreased pulses in feet    Chronic idiopathic constipation    Diabetic ulcer of toe of left foot associated with type 2 diabetes mellitus ([address]    Atherosclerosis of native artery of left lower extremity with ulceration of midfoot ([address]    Ischemic stroke ([address]    Cerebral infarction due to embolism of other cerebral artery ([address]    UGIB (upper gastrointestinal bleed)       Thank you for allowing to us to participate in the care or Leida Block. Further evaluation will be based upon the patient's clinical course and testing results.

## 2018-11-22 NOTE — PLAN OF CARE
Problem: Falls - Risk of:  Goal: Will remain free from falls  Will remain free from falls   Outcome: Met This Shift  Pt remains free from falls and accidental injury at this time. Bed in lowest position with wheels locked. Non skid footwear on feet. Pt instructed to call out for needs, verbalizes understanding. Call light within reach. Bed alarm activated. Will cont to monitor.   Kaushik Gusman, RN

## 2018-11-22 NOTE — CONSULTS
Gastroenterology Consult Note      Patient: Rolando Jo  : 1955  CSN#:      Date:  2018    Subjective:       History of Present Illness  Patient is a 58 y.o.  female admitted with UGIB (upper gastrointestinal bleed) [K92.2]  UGIB (upper gastrointestinal bleed) [K92.2] who is seen in consult for anemia. Pt is a very poor history teller. Some of history obtained from chart. Pt has recent stroke and PAF so is on Eliquis. She apparently takes some  NSAIDs at home periodically. Was noted to have had drop in hgb from 11.7 in Oct to 8.3 now. No bm since being at hospital.  Denies ab pain, vomiting, brpbr. In ER stool was \"dark\" but no gross blood.         Past Medical History:   Diagnosis Date    Allergic rhinitis due to pollen     Arthritis     Breast cancer ([address]     left     Breast cancer, left breast ([address] 2012    chemo and radiation    Bronchitis, mucopurulent recurrent ([address]     Cerebrovascular disease     CVA x 4, last mid 2016    COPD (chronic obstructive pulmonary disease) ([address]     Coronary artery disease     stent 2010    DDD (degenerative disc disease), lumbosacral 2012    Depression     Diabetes mellitus ([address]     Diabetes mellitus out of control ([address]     Diabetic retinopathy ([address] 1/3/2013    Hand pain     and feet neuroapthy from DM    Herniated lumbar disc without myelopathy     HTN (hypertension)     Hyperlipidemia LDL goal <70     Insomnia     Irritable bowel syndrome     Neuropathy     of feet    osteoporosis     Stroke (cerebrum) ([address] Dec 11 2015    TIA 2012    Tobacco abuse     current      Past Surgical History:   Procedure Laterality Date    BREAST SURGERY  12    left breast segmental mastectomy    COLONOSCOPY      CORONARY ANGIOPLASTY WITH STENT PLACEMENT          CORONARY ANGIOPLASTY WITH STENT PLACEMENT  2014    INSERTABLE CARDIAC MONITOR      TONSILLECTOMY      TUBAL LIGATION  TUNNELED VENOUS PORT PLACEMENT  6/6/12        Admission Meds  No current facility-administered medications on file prior to encounter. Current Outpatient Prescriptions on File Prior to Encounter   Medication Sig Dispense Refill    apixaban (ELIQUIS) 5 MG TABS tablet Take 1 tablet by mouth 2 times daily 60 tablet 1    atorvastatin (LIPITOR) 40 MG tablet Take 1 tablet by mouth nightly 30 tablet 1    amLODIPine (NORVASC) 5 MG tablet Take 1 tablet by mouth daily 30 tablet 1    aspirin 81 MG EC tablet Take 1 tablet by mouth daily 30 tablet 1    insulin lispro (HUMALOG) 100 UNIT/ML injection vial Inject into the skin 3 times daily (before meals)      insulin glargine (TOUJEO SOLOSTAR) 300 UNIT/ML injection pen Inject 10 Units into the skin nightly 5 Pen 3    insulin lispro (HUMALOG) 100 UNIT/ML pen Inject 0-10 Units into the skin 3 times daily (before meals) Less than 130 - no insulin; 130-150 = 5 units, greater than 150 = 10 units 2 Pen 2    ergocalciferol (ERGOCALCIFEROL) 50563 units capsule Take 1 capsule by mouth once a week (Patient taking differently: Take 50,000 Units by mouth once a week Take every Friday) 2 capsule 2    Glucose Blood (BLOOD GLUCOSE TEST STRIPS) STRP Use as directed - test three times daily 100 strip 5    Blood Glucose Monitoring Suppl (BLOOD GLUCOSE SYSTEM ALAYNA) KIT Test three times daily 1 kit 0    Lancets MISC Use for each blood sugar test 100 each 5    Alcohol Swabs (ALCOHOL WIPES) PADS Use with testing three times daily 100 each 5           Allergies  Allergies   Allergen Reactions    Lyrica [Pregabalin]      Hallucinated with this. Hallucinated with this.     Metformin Diarrhea      Social   Social History   Substance Use Topics    Smoking status: Current Some Day Smoker     Packs/day: 1.00     Years: 40.00     Types: Cigarettes     Last attempt to quit: 8/15/2016    Smokeless tobacco: Never Used    Alcohol use No            Review of Systems  10 sys rev and all neg per pt. But does not seem reliable. Physical Exam  Blood pressure 133/79, pulse 82, temperature 97.2 °F (36.2 °C), temperature source Temporal, resp. rate 16, height 5' 4\" (1.626 m), weight 141 lb 11.2 oz (64.3 kg), SpO2 99 %, not currently breastfeeding. General appearance: alert, cooperative, no distress, appears stated age  Eyes: Anicteric  Head: Normocephalic, without obvious abnormality  Lungs: clear to auscultation bilaterally, Normal Effort  Heart: regular rate and rhythm, normal S1 and S2, no murmurs or rubs  Abdomen: soft, non-tender. Bowel sounds normal. No masses,  no organomegaly. Extremities: atraumatic, no cyanosis or edema  Skin: warm and dry, no jaundice  Neuro: no dysarthia or aphasia. A&OX3, moves all 4 extremities. Mood ok affect flat        Data Review:    Recent Labs      11/21/18 1747 11/22/18   0030   WBC  8.5   --    HGB  9.0*  8.3*   HCT  27.7*  24.7*   MCV  93.3   --    PLT  265   --      Recent Labs      11/21/18 1747   NA  138   K  4.0   CL  102   CO2  25   BUN  12   CREATININE  1.5*     Recent Labs      11/21/18 1747   AST  16   ALT  11   BILITOT  <0.2   ALKPHOS  100     No results for input(s): LIPASE, AMYLASE in the last 72 hours. Recent Labs      11/21/18 1747   PROTIME  14.4*   INR  1.26*                    Assessment:   1. Anemia-   Drop in hgb over last month. Dark stool noted on FIDELIA in ER. No stool since being here. Certainly PUD from nsaids and being on eliquis is possible. No current sign of significant active bleeding.

## 2018-11-22 NOTE — PROGRESS NOTES
H/p dictation id [id number]. Date of service 11/22/18. Acute post hemorrhagic anemia. UGIB.  PUD. PAF. DM II.  HTN.

## 2018-11-22 NOTE — PROGRESS NOTES
Patient admitted after midnight with anemia-hemoglobin down on routine labs done by PCP. Had some dark stool in ED. Patient denies pain. Has short term memory loss from previous stroke/dementia. Is on eliquis for afib. Hemoglobin currently 8.2. Discussed with Dr Jena Yoon. Plan for EGD tomorrow unless she develops hemorrhage. Add low dose SSI for now. Start NS at 75/hr.       Abbi Parekh PA-C

## 2018-11-22 NOTE — ED PROVIDER NOTES
I independently performed a history and physical on Tricia Wells. All diagnostic, treatment, and disposition decisions were made by myself in conjunction with the advanced practice provider. Briefly, this is a 58 y.o. female here for GI Bleed on an anticoagulant. She reports mild abd cramping, but sees dark stools. Symptoms are mild, no known modifying factors present. On exam, comfortable. Heme+ via KEVIN. No resp distress  Non tender abd    EKG  No acute ischemia            MDM    H&H appears OK but there is a drop from baseline, not needing obvious blood right now. VSS  Admit w/ GI consult      Critical Care  There was a high probability of life-threatening clinical deterioration in the patient's condition requiring my urgent intervention. Total critical care time with the patient was 33 minutes excluding separately reportable procedures. Critical care required due to patients GIB on Eloquis        Patient Referrals:  [address]  [place] [address] [address]  [phone]            Discharge Medications:  Current Discharge Medication List          FINAL IMPRESSION  1. Gastrointestinal hemorrhage, unspecified gastrointestinal hemorrhage type    2. Adequate anticoagulation on anticoagulant therapy    3. History of multiple CVAs (cerebrovascular accident)    4. History of atrial fibrillation        Blood pressure 134/73, pulse 80, temperature 97.5 °F (36.4 °C), temperature source Temporal, resp. rate 16, height 5' 4\" (1.626 m), weight 141 lb 11.2 oz (64.3 kg), SpO2 99 %, not currently breastfeeding. For further details of [facility] emergency department encounter, please see documentation by advanced practice provider. Patient Referrals:  [address]  [place] [address]  [phone]            Discharge Medications:  Current Discharge Medication List          FINAL IMPRESSION  1.  Gastrointestinal hemorrhage, unspecified gastrointestinal hemorrhage type    2. Adequate anticoagulation on anticoagulant therapy    3. History of multiple CVAs (cerebrovascular accident)    4. History of atrial fibrillation        Blood pressure 134/73, pulse 80, temperature 97.5 °F (36.4 °C), temperature source Temporal, resp. rate 16, height 5' 4\" (1.626 m), weight 141 lb 11.2 oz (64.3 kg), SpO2 99 %, not currently breastfeeding. For further details of [facility] emergency department encounter, please see documentation by advanced practice provider.        Wilberto Dorsey III, DO  11/22/18 1548

## 2018-11-22 NOTE — ED NOTES
Patient brought back to room 7 from bathroom - patient unable to give sample. Linda Mas, primary RN notified.       Dao Tsang RN  11/21/18 1159

## 2018-11-22 NOTE — H&P
[facility] [address]                     [address] [facility], [address]                              HISTORY AND PHYSICAL    PATIENT NAME: Kishan Lucas                  :        1955  MED REC NO:   [id number]                          ROOM:       3366  ACCOUNT NO:   [de-identified]                           ADMIT DATE: 2018  PROVIDER:     Irma Tucker MD    DATE OF SERVICE:  I obtained the history and performed physical exam on  the patient on the progressive care floor on 2018. CHIEF COMPLAINT:  \"I was sent by my doctor. \"    HISTORY OF PRESENT ILLNESS:  The patient is a 65-year-old   American female presenting to the hospital per her doctor's request  because of what has been described as significant drop in the hemoglobin  over around 3 to 4 gm in a short duration of time. The patient states  that she does not recall any dark stool however it was noted that she  has had dark stool in the emergency room. The patient also states that  she has been having some gradually progressive increasing generalized  weakness without any obvious nausea or vomiting, fevers or chills. The  patient is overall not a very good historian and her  has  interjected/interrupted my history taking numerous times to a point  where I had to politely request him to let me obtain history from the  patient. The patient denies any hemoptysis or hematemesis. The patient does state that she takes Aleve or Motrin at home  intermittently for pain. PAST MEDICAL/PAST SURGICAL HISTORY:  1. Paroxysmal atrial fibrillation. 2.  Multiple cerebrovascular accidents. 3.  COPD. 4.  Coronary artery disease. 5.  Severe peripheral vascular disease. 6.  Type 2 diabetes mellitus. 7.  Hypertension. ALLERGIC HISTORY:  Elliott Pearson. FAMILY HISTORY:  Reviewed by me and is currently noncontributory. SOCIAL HISTORY:  Lives at home.   Smokes around a pack of cigarettes a  day. No illicit substance use. MEDICATIONS:  Home medication list has been reviewed. REVIEW OF SYSTEMS:  The patient's review of systems is significant for  the weakness, the black stool and per the history of present illness. All other systems have been reviewed and are negative except for the  history of present illness. PHYSICAL EXAMINATION:  The patient was examined by me on the progressive  care floor. VITAL SIGNS:  Temperature 98.3, respiratory rate 20, pulse 90, blood  pressure 155/71, saturating 99%. CNS:  Alert, awake and oriented to time, place and person. PSYCH:  Cooperative, pleasant, answering questions appropriately. EYES:  Pupils are reactive to light. Extraocular muscle movements are  intact. Conjunctival pallor noted. RESPIRATORY SYSTEM:  Clear to auscultation. CVS:  Currently regular rate and rhythm. S1, S2 are heard. ABDOMEN:  Soft. No guarding, rigidity or rebound. MUSCULOSKELETAL:  No acute obvious deformities. SKIN:  Without any obvious rashes or lesions. DIAGNOSTIC DATA:  Hemoglobin initially was 9.0 at the time of  presentation, repeat hemoglobin is 8.3. Hemoccult positive stool. INR  is 1.26. Type and screen 0 positive with antibody screen negative. CONSULTATIONS:  Gastroenterology and Cardiology. Review of previous medical records shows a 2D echo from 10/11/2018 that  showed an LVEF of around 65%. Angiography reports, peripheral angio was done by  _____ in 2017 and  2018. The patient has had peripheral vascular stenting done. ASSESSMENT:  1. Acute on chronic post hemorrhagic anemia secondary to acute upper  gastrointestinal hemorrhage secondary to a previously undiagnosed peptic  ulcer disease. 2.  Severe peripheral vascular occlusive disease. 3.  Paroxysmal atrial fibrillation. 4.  Right-sided hemiparesis as a delayed effect of multiple  cerebrovascular accidents. 5.  Type 2 diabetes mellitus. 6.  Tobacco dependence.   7.

## 2018-11-23 ENCOUNTER — ANESTHESIA (OUTPATIENT)
Dept: ENDOSCOPY | Age: 63
DRG: 378 | End: 2018-11-23
Payer: MEDICARE

## 2018-11-23 ENCOUNTER — ANESTHESIA EVENT (OUTPATIENT)
Dept: ENDOSCOPY | Age: 63
DRG: 378 | End: 2018-11-23
Payer: MEDICARE

## 2018-11-23 VITALS — OXYGEN SATURATION: 100 % | SYSTOLIC BLOOD PRESSURE: 99 MMHG | DIASTOLIC BLOOD PRESSURE: 55 MMHG

## 2018-11-23 LAB
ANION GAP SERPL CALCULATED.3IONS-SCNC: 9 MMOL/L (ref 3–16)
BASOPHILS ABSOLUTE: 0.1 K/UL (ref 0–0.2)
BASOPHILS RELATIVE PERCENT: 0.9 %
BLOOD BANK DISPENSE STATUS: NORMAL
BLOOD BANK PRODUCT CODE: NORMAL
BPU ID: NORMAL
BUN BLDV-MCNC: 7 MG/DL (ref 7–20)
CALCIUM SERPL-MCNC: 8.8 MG/DL (ref 8.3–10.6)
CHLORIDE BLD-SCNC: 105 MMOL/L (ref 99–110)
CO2: 23 MMOL/L (ref 21–32)
CREAT SERPL-MCNC: 1 MG/DL (ref 0.6–1.2)
DESCRIPTION BLOOD BANK: NORMAL
EOSINOPHILS ABSOLUTE: 0.2 K/UL (ref 0–0.6)
EOSINOPHILS RELATIVE PERCENT: 3.5 %
GFR AFRICAN AMERICAN: >60
GFR NON-AFRICAN AMERICAN: 56
GLUCOSE BLD-MCNC: 100 MG/DL (ref 70–99)
GLUCOSE BLD-MCNC: 102 MG/DL (ref 70–99)
GLUCOSE BLD-MCNC: 106 MG/DL (ref 70–99)
GLUCOSE BLD-MCNC: 118 MG/DL (ref 70–99)
GLUCOSE BLD-MCNC: 79 MG/DL (ref 70–99)
GLUCOSE BLD-MCNC: 90 MG/DL (ref 70–99)
GLUCOSE BLD-MCNC: 91 MG/DL (ref 70–99)
HCT VFR BLD CALC: 21.5 % (ref 36–48)
HCT VFR BLD CALC: 30.7 % (ref 36–48)
HEMOGLOBIN: 10.2 G/DL (ref 12–16)
HEMOGLOBIN: 7 G/DL (ref 12–16)
LYMPHOCYTES ABSOLUTE: 0.7 K/UL (ref 1–5.1)
LYMPHOCYTES RELATIVE PERCENT: 10.8 %
MCH RBC QN AUTO: 30.6 PG (ref 26–34)
MCHC RBC AUTO-ENTMCNC: 32.3 G/DL (ref 31–36)
MCV RBC AUTO: 94.7 FL (ref 80–100)
MONOCYTES ABSOLUTE: 0.4 K/UL (ref 0–1.3)
MONOCYTES RELATIVE PERCENT: 5.9 %
NEUTROPHILS ABSOLUTE: 5.3 K/UL (ref 1.7–7.7)
NEUTROPHILS RELATIVE PERCENT: 78.9 %
PDW BLD-RTO: 14.4 % (ref 12.4–15.4)
PERFORMED ON: ABNORMAL
PERFORMED ON: NORMAL
PLATELET # BLD: 192 K/UL (ref 135–450)
PMV BLD AUTO: 8.7 FL (ref 5–10.5)
POTASSIUM SERPL-SCNC: 3.8 MMOL/L (ref 3.5–5.1)
RBC # BLD: 2.27 M/UL (ref 4–5.2)
SODIUM BLD-SCNC: 137 MMOL/L (ref 136–145)
WBC # BLD: 6.7 K/UL (ref 4–11)

## 2018-11-23 PROCEDURE — 3609013200 HC EGD W/ ABLATION: Performed by: INTERNAL MEDICINE

## 2018-11-23 PROCEDURE — 2580000003 HC RX 258: Performed by: PHYSICIAN ASSISTANT

## 2018-11-23 PROCEDURE — 6360000002 HC RX W HCPCS: Performed by: INTERNAL MEDICINE

## 2018-11-23 PROCEDURE — 3700000000 HC ANESTHESIA ATTENDED CARE: Performed by: INTERNAL MEDICINE

## 2018-11-23 PROCEDURE — 6360000002 HC RX W HCPCS: Performed by: NURSE ANESTHETIST, CERTIFIED REGISTERED

## 2018-11-23 PROCEDURE — 6370000000 HC RX 637 (ALT 250 FOR IP): Performed by: INTERNAL MEDICINE

## 2018-11-23 PROCEDURE — 2500000003 HC RX 250 WO HCPCS: Performed by: NURSE ANESTHETIST, CERTIFIED REGISTERED

## 2018-11-23 PROCEDURE — 2709999900 HC NON-CHARGEABLE SUPPLY: Performed by: INTERNAL MEDICINE

## 2018-11-23 PROCEDURE — 6370000000 HC RX 637 (ALT 250 FOR IP): Performed by: NURSE PRACTITIONER

## 2018-11-23 PROCEDURE — 0W3P8ZZ CONTROL BLEEDING IN GASTROINTESTINAL TRACT, VIA NATURAL OR ARTIFICIAL OPENING ENDOSCOPIC: ICD-10-PCS | Performed by: INTERNAL MEDICINE

## 2018-11-23 PROCEDURE — 85025 COMPLETE CBC W/AUTO DIFF WBC: CPT

## 2018-11-23 PROCEDURE — 36430 TRANSFUSION BLD/BLD COMPNT: CPT

## 2018-11-23 PROCEDURE — 85018 HEMOGLOBIN: CPT

## 2018-11-23 PROCEDURE — 2720000010 HC SURG SUPPLY STERILE: Performed by: INTERNAL MEDICINE

## 2018-11-23 PROCEDURE — 99233 SBSQ HOSP IP/OBS HIGH 50: CPT | Performed by: INTERNAL MEDICINE

## 2018-11-23 PROCEDURE — 7100000000 HC PACU RECOVERY - FIRST 15 MIN: Performed by: INTERNAL MEDICINE

## 2018-11-23 PROCEDURE — P9016 RBC LEUKOCYTES REDUCED: HCPCS

## 2018-11-23 PROCEDURE — 2060000000 HC ICU INTERMEDIATE R&B

## 2018-11-23 PROCEDURE — 85014 HEMATOCRIT: CPT

## 2018-11-23 PROCEDURE — 86923 COMPATIBILITY TEST ELECTRIC: CPT

## 2018-11-23 PROCEDURE — 80048 BASIC METABOLIC PNL TOTAL CA: CPT

## 2018-11-23 PROCEDURE — 2580000003 HC RX 258: Performed by: INTERNAL MEDICINE

## 2018-11-23 PROCEDURE — C9113 INJ PANTOPRAZOLE SODIUM, VIA: HCPCS | Performed by: INTERNAL MEDICINE

## 2018-11-23 PROCEDURE — 3700000001 HC ADD 15 MINUTES (ANESTHESIA): Performed by: INTERNAL MEDICINE

## 2018-11-23 PROCEDURE — 7100000001 HC PACU RECOVERY - ADDTL 15 MIN: Performed by: INTERNAL MEDICINE

## 2018-11-23 RX ORDER — HYDRALAZINE HYDROCHLORIDE 25 MG/1
25 TABLET, FILM COATED ORAL ONCE
Status: COMPLETED | OUTPATIENT
Start: 2018-11-23 | End: 2018-11-23

## 2018-11-23 RX ORDER — LIDOCAINE HYDROCHLORIDE 20 MG/ML
INJECTION, SOLUTION EPIDURAL; INFILTRATION; INTRACAUDAL; PERINEURAL PRN
Status: DISCONTINUED | OUTPATIENT
Start: 2018-11-23 | End: 2018-11-23 | Stop reason: SDUPTHER

## 2018-11-23 RX ORDER — PROPOFOL 10 MG/ML
INJECTION, EMULSION INTRAVENOUS PRN
Status: DISCONTINUED | OUTPATIENT
Start: 2018-11-23 | End: 2018-11-23 | Stop reason: SDUPTHER

## 2018-11-23 RX ORDER — DILTIAZEM HYDROCHLORIDE 120 MG/1
120 CAPSULE, COATED, EXTENDED RELEASE ORAL DAILY
Status: DISCONTINUED | OUTPATIENT
Start: 2018-11-23 | End: 2018-11-24

## 2018-11-23 RX ORDER — PANTOPRAZOLE SODIUM 40 MG/1
40 TABLET, DELAYED RELEASE ORAL
Status: CANCELLED | OUTPATIENT
Start: 2018-11-24

## 2018-11-23 RX ORDER — 0.9 % SODIUM CHLORIDE 0.9 %
250 INTRAVENOUS SOLUTION INTRAVENOUS ONCE
Status: COMPLETED | OUTPATIENT
Start: 2018-11-23 | End: 2018-11-24

## 2018-11-23 RX ORDER — SODIUM CHLORIDE 0.9 % (FLUSH) 0.9 %
10 SYRINGE (ML) INJECTION EVERY 12 HOURS SCHEDULED
Status: CANCELLED | OUTPATIENT
Start: 2018-11-23

## 2018-11-23 RX ORDER — SODIUM CHLORIDE 9 MG/ML
INJECTION, SOLUTION INTRAVENOUS CONTINUOUS
Status: CANCELLED | OUTPATIENT
Start: 2018-11-23

## 2018-11-23 RX ORDER — PANTOPRAZOLE SODIUM 40 MG/1
40 TABLET, DELAYED RELEASE ORAL DAILY
Qty: 30 TABLET | Refills: 1 | Status: SHIPPED | OUTPATIENT
Start: 2018-11-23 | End: 2019-05-06 | Stop reason: SDUPTHER

## 2018-11-23 RX ORDER — SODIUM CHLORIDE 0.9 % (FLUSH) 0.9 %
10 SYRINGE (ML) INJECTION PRN
Status: CANCELLED | OUTPATIENT
Start: 2018-11-23

## 2018-11-23 RX ADMIN — PROPOFOL 20 MG: 10 INJECTION, EMULSION INTRAVENOUS at 09:00

## 2018-11-23 RX ADMIN — SODIUM CHLORIDE 250 ML: 9 INJECTION, SOLUTION INTRAVENOUS at 17:43

## 2018-11-23 RX ADMIN — PROPOFOL 20 MG: 10 INJECTION, EMULSION INTRAVENOUS at 08:58

## 2018-11-23 RX ADMIN — SODIUM CHLORIDE: 9 INJECTION, SOLUTION INTRAVENOUS at 00:43

## 2018-11-23 RX ADMIN — PANTOPRAZOLE SODIUM 40 MG: 40 INJECTION, POWDER, FOR SOLUTION INTRAVENOUS at 17:39

## 2018-11-23 RX ADMIN — HYDRALAZINE HYDROCHLORIDE 25 MG: 25 TABLET, FILM COATED ORAL at 23:21

## 2018-11-23 RX ADMIN — PROPOFOL 10 MG: 10 INJECTION, EMULSION INTRAVENOUS at 09:01

## 2018-11-23 RX ADMIN — PROPOFOL 30 MG: 10 INJECTION, EMULSION INTRAVENOUS at 08:57

## 2018-11-23 RX ADMIN — LIDOCAINE HYDROCHLORIDE 50 MG: 20 INJECTION, SOLUTION EPIDURAL; INFILTRATION; INTRACAUDAL; PERINEURAL at 08:57

## 2018-11-23 RX ADMIN — DILTIAZEM HYDROCHLORIDE 120 MG: 120 CAPSULE, COATED, EXTENDED RELEASE ORAL at 17:39

## 2018-11-23 RX ADMIN — DESMOPRESSIN ACETATE 40 MG: 0.2 TABLET ORAL at 21:23

## 2018-11-23 RX ADMIN — Medication 10 ML: at 11:27

## 2018-11-23 RX ADMIN — Medication 10 ML: at 21:23

## 2018-11-23 RX ADMIN — Medication 10 ML: at 09:00

## 2018-11-23 RX ADMIN — PANTOPRAZOLE SODIUM 40 MG: 40 INJECTION, POWDER, FOR SOLUTION INTRAVENOUS at 05:59

## 2018-11-23 ASSESSMENT — PAIN SCALES - GENERAL
PAINLEVEL_OUTOF10: 0

## 2018-11-23 ASSESSMENT — PULMONARY FUNCTION TESTS
PIF_VALUE: 0

## 2018-11-23 NOTE — DISCHARGE INSTR - COC
with hyperglycemia, with long-term current use of insulin ([facility]) E11.65, Z79.4    Coronary artery disease I25.10    Smoking F17.200    Breast cancer, left breast ([facility] C50.912    DDD (degenerative disc disease), lumbosacral M51.37    Diabetic peripheral neuropathy ([facility]) E11.42    Diabetic retinopathy ([facility] E11.319    COPD (chronic obstructive pulmonary disease) ([facility]) J44.9    Arthritis M19.90    Ataxia R27.0    GERD (gastroesophageal reflux disease) K21.9    Diabetic polyneuropathy ([facility]) E11.42    Dermatophytosis of nail B35.1    Precordial pain R07.2    Weakness of right leg R29.898    Cerebrovascular accident (CVA) due to occlusion of cerebral artery ([facility]) I63.50    Essential hypertension I10    Severe mitral regurgitation I34.0    Coronary artery disease involving native coronary artery of native heart without angina pectoris I25.10    Cerebrovascular accident (CVA) ([facility]) I63.9    Generalized osteoarthritis M15.9    Vitamin D deficiency E55.9    Dysphagia due to recent stroke I69.391    Dysarthria R47.1    PAF (paroxysmal atrial fibrillation) ([facility]) I48.0    Hypokalemia E87.6    Encounter for electronic analysis of reveal event recorder Z45.09    Arterial ischemic stroke, ICA, left, acute ([facility]) N75.897    DM (diabetes mellitus), secondary, uncontrolled, w/neurologic complic ([facility]) T01.44, B07.74    Benign essential HTN I10    Paresis ([facility] G83.9    Diabetic ulcer of left foot associated with type 2 diabetes mellitus ([facility]) E11.621, L97.529    Decreased pulses in feet R09.89    Chronic idiopathic constipation K59.04    Diabetic ulcer of toe of left foot associated with type 2 diabetes mellitus ([facility]) E11.621, L97.529    Atherosclerosis of native artery of left lower extremity with ulceration of midfoot ([facility]) I70.244    Ischemic stroke ([facility]) I63.9    Cerebral infarction due to embolism of other cerebral artery ([facility]) I63.49    Gastrointestinal hemorrhage K92.2    History of atrial fibrillation Z86.79    History of CVA (cerebrovascular accident) Z86.73       Isolation/Infection:   Isolation          No Isolation            Nurse Assessment:  Last Vital Signs: BP (!) 147/68   Pulse 84   Temp 98 °F (36.7 °C) (Temporal)   Resp 14   Ht 5' 4\" (1.626 m)   Wt 141 lb 9.6 oz (64.2 kg)   SpO2 100%   BMI 24.31 kg/m²     Last documented pain score (0-10 scale): Pain Level: 0  Last Weight:   Wt Readings from Last 1 Encounters:   11/23/18 141 lb 9.6 oz (64.2 kg)     Mental Status:  oriented, alert and poor short term memory, poor attention, poor safety awareness    IV Access:  - None    Nursing Mobility/ADLs:  Walking   Assisted  Transfer  Assisted  Bathing  Assisted  Dressing  Assisted  Toileting  Assisted  Feeding  Independent  Med Admin  Assisted  Med Delivery   whole and prefers mixed with apple sauce    Wound Care Documentation and Therapy:        Elimination:  Continence:   · Bowel: Yes  · Bladder: Yes  Urinary Catheter: None   Colostomy/Ileostomy/Ileal Conduit: No       Date of Last BM: 11/21/2018    Intake/Output Summary (Last 24 hours) at 11/23/18 1415  Last data filed at 11/23/18 0935   Gross per 24 hour   Intake          2369.25 ml   Output             1675 ml   Net           694.25 ml     I/O last 3 completed shifts: In: 2779.3 [P.O.:1560; I.V.:1219.3]  Out: 1432 [Urine:1675]    Safety Concerns:     History of Falls (last 30 days), At Risk for Falls and severe peripheral neuropathy feet & hands    Impairments/Disabilities:      Vision and legs weak & unsteady, severe neuropathy feet    Nutrition Therapy:  Current Nutrition Therapy:   - Oral Diet:  Carb Control 4 carbs/meal (1800kcals/day)    Routes of Feeding: Oral  Liquids: No Restrictions  Daily Fluid Restriction: no  Last Modified Barium Swallow with Video (Video Swallowing Test): not done    Treatments at the Time of Hospital Discharge:   Respiratory Treatments: none  Oxygen Therapy:  is not on home oxygen therapy.   Ventilator:    - No ventilator support    Rehab Therapies: sn,pt,ot,  Weight Bearing Status/Restrictions: No weight bearing restirctions  Other Medical Equipment (for information only, NOT a DME order):  cane, walker, bath bench and bedside commode  Other Treatments: none    Patient's personal belongings (please select all that are sent with patient):  Jeanne Gutierrez, cell phone, clothes    RN SIGNATURE:  Electronically signed by Edward Tucker RN on 11/24/18 at 1:23 PM    CASE MANAGEMENT/SOCIAL WORK SECTION    Inpatient Status Date: 11/21/18    Readmission Risk Assessment Score:  Readmission Risk              Risk of Unplanned Readmission:        21         Discharging to Facility/ Agency   Name: Jerson Manriquez will call for Appointment  Phone: [phone]  Fax: [phone]    / signature: Electronically signed by GRACIA Ramírez, LSW on 11/24/18 at 1:49 PM    PHYSICIAN SECTION    Prognosis: Fair    Condition at Discharge: Stable    Rehab Potential (if transferring to Rehab): Fair    Recommended Labs or Other Treatments After Discharge: follow up with PCP in one week and Dr Matilda Patel as scheduled    Physician Certification: I certify the above information and transfer of Lay Valenzuela  is necessary for the continuing treatment of the diagnosis listed and that she requires Home Care for less 30 days.      Update Admission H&P: No change in H&P    PHYSICIAN SIGNATURE:  Electronically signed by Lolis London PA-C on 11/24/18 at 12:49 PM

## 2018-11-23 NOTE — PROGRESS NOTES
[facility] PROGRESS NOTE    11/23/2018 2:49 PM        Name: Kadeem Rendon . Admitted: 11/21/2018  Primary Care Provider: Shaniqau Pope (Tel: [phone])      Subjective:    Patient feeling well, wants to go home. No new complaints. Discussed with  and sister (POA at bedside)    Reviewed interval ancillary notes    Current Medications    0.9 % sodium chloride bolus Once   atorvastatin (LIPITOR) tablet 40 mg Nightly   insulin lispro (HUMALOG) injection pen 0-6 Units TID WC   insulin lispro (HUMALOG) injection pen 0-3 Units Nightly   glucose (GLUTOSE) 40 % oral gel 15 g PRN   dextrose 50 % solution 12.5 g PRN   glucagon (rDNA) injection 1 mg PRN   dextrose 5 % solution PRN   sodium chloride flush 0.9 % injection 10 mL 2 times per day   sodium chloride flush 0.9 % injection 10 mL PRN   ondansetron (ZOFRAN) injection 4 mg Q6H PRN   pantoprazole (PROTONIX) injection 40 mg Q12H   And    sodium chloride (PF) 0.9 % injection 10 mL Q12H       Objective:  BP (!) 147/68   Pulse 84   Temp 98 °F (36.7 °C) (Temporal)   Resp 14   Ht 5' 4\" (1.626 m)   Wt 141 lb 9.6 oz (64.2 kg)   SpO2 100%   BMI 24.31 kg/m²     Intake/Output Summary (Last 24 hours) at 11/23/18 1449  Last data filed at 11/23/18 0935   Gross per 24 hour   Intake          2369.25 ml   Output             1675 ml   Net           694.25 ml    Wt Readings from Last 3 Encounters:   11/23/18 141 lb 9.6 oz (64.2 kg)   10/30/18 145 lb (65.8 kg)   10/12/18 151 lb 4.8 oz (68.6 kg)       General appearance:  Appears comfortable  Eyes: Sclera clear. Pupils equal.  ENT: Moist oral mucosa. Trachea midline, no adenopathy. Cardiovascular: Regular rhythm, normal S1, S2. No murmur. No edema in lower extremities  Respiratory: Not using accessory muscles. Good inspiratory effort. Clear to auscultation bilaterally, no wheeze or crackles.    GI: Abdomen soft, no tenderness, not distended, normal bowel sounds  Musculoskeletal: No cyanosis in digits, neck supple  Neurology: CN 2-12 grossly intact. No speech or motor deficits  Psych: Normal affect. Alert and oriented in time, place and person  Skin: Warm, dry, normal turgor    Labs and Tests:  CBC:   Recent Labs      11/21/18   1747   11/22/18   0808  11/22/18   1623  11/23/18   1300   WBC  8.5   --    --    --   6.7   HGB  9.0*   < >  8.2*  8.4*  7.0*   PLT  265   --    --    --   192    < > = values in this interval not displayed. BMP:  Recent Labs      11/21/18 1747 11/23/18   1300   NA  138  137   K  4.0  3.8   CL  102  105   CO2  25  23   BUN  12  7   CREATININE  1.5*  1.0   GLUCOSE  150*  118*     Hepatic: Recent Labs      11/21/18 1747   AST  16   ALT  11   BILITOT  <0.2   ALKPHOS  100       Problem List  Active Problems:    Gastrointestinal hemorrhage    History of atrial fibrillation    History of CVA (cerebrovascular accident)  Resolved Problems:    * No resolved hospital problems. *       Assessment & Plan:   1. UGI bleed secondary to eliquis induced coagulopathy-has several cherry red spots in stomach and duodenum that were cauterized. ? AVM. Continue PPI. Ok to restart eliquis tomorrow. 2. Acute blood loss anemia-hemoglobin down to 7. Transfuse 1 unit of blood. 3. Afib-on eliquis. Followed by Dr Imani Powell. Has appt in a few weeks. May need to discuss Watchman procedure if she continue to have problems with bleeding. 4. H/o CVA  5. Dementia  GI bleeding from PUD due to Eliquis induced coagulopathy.       Diet: DIET CARB CONTROL;  Code:Full Code      Efrain Lugo PA-C   11/23/2018 2:49 PM

## 2018-11-23 NOTE — PLAN OF CARE
Problem: Falls - Risk of:  Goal: Absence of physical injury  Absence of physical injury   To bedside commode & back to bed with maximum assistance of 2 & standard walker, the patient very impulsive with poor attention span & poor short term memory, legs very weak & unsteady. Will continue to monitor. Dejuan Tao RN, BSN, PCCN.

## 2018-11-23 NOTE — PROGRESS NOTES
The patient is resting with eyes closed, doesn't arouse to soft voice. Will continue to monitor. Mika Huynh RN, BSN.

## 2018-11-23 NOTE — PLAN OF CARE
Problem: Risk for Impaired Skin Integrity  Goal: Tissue integrity - skin and mucous membranes  Structural intactness and normal physiological function of skin and  mucous membranes. Outcome: Ongoing  Pt able to turn self, calls out appropriately for assistance, uses walker to bedside commode. Problem: Falls - Risk of:  Goal: Will remain free from falls  Will remain free from falls   Outcome: Ongoing  Pt remains free from falls. Safety precautions in place. Bed in lowest position, bed wheels locked, call light with in reach, bed alarm on, yellow blanket in place, fall risk wrist band on, SAFE outside of doorway. Will continue to monitor. Problem: Fluid Volume - Imbalance:  Goal: Will show no signs and symptoms of excessive bleeding  Will show no signs and symptoms of excessive bleeding   Outcome: Ongoing  No observed signs of GI bleed overnight, no bowel movements noted.

## 2018-11-23 NOTE — PROGRESS NOTES
[address] [phone] was active with this pt prior to admit. Discharge planner notified. Will follow.

## 2018-11-23 NOTE — PROGRESS NOTES
[facility]   Cardiology Progress Note     Admit Date: 2018     Reason for follow up: Atrial fibrillation, GIB    HPI and Interval History: PMH of PAF, multiple CVA x 4, hx left breast cancer, DJD, COPD, DM, HTN, HLD, CADs/p stent , and s/p loop recorder . Presented due to anemia  Patient seen and examined. Clinical notes reviewed. Telemetry reviewed. No new complaint today. No major events overnight. Denies having chest pain, shortness of breath, dyspnea on exertion, Orthopnea, PND at the time of this visit. Review of System:  All other systems reviewed and are negative except for that noted above. Pertinent negatives are:     · General: negative for fever, chills   · Ophthalmic ROS: negative for - eye pain or loss of vision  · ENT ROS: negative for - headaches, sore throat   · Respiratory: negative for - cough, sputum  · Cardiovascular: Reviewed in HPI  · Gastrointestinal: negative for - abdominal pain, diarrhea, N/V  · Hematology: negative for - bleeding, blood clots, bruising or jaundice  · Genito-Urinary:  negative for - Dysuria or incontinence  · Musculoskeletal: negative for - Joint swelling, muscle pain  · Neurological: negative for - confusion, dizziness, headaches   · Psychiatric: No anxiety, no depression. · Dermatological: negative for - rash      Physical Examination:  Vitals:    18 1045   BP: (!) 161/82   Pulse: 77   Resp: 16   Temp: 97.8 °F (36.6 °C)   SpO2: 98%      In: 2369.3 [P.O.:840;  I.V.:1529.3]  Out: 1675    Wt Readings from Last 3 Encounters:   18 141 lb 9.6 oz (64.2 kg)   10/30/18 145 lb (65.8 kg)   10/12/18 151 lb 4.8 oz (68.6 kg)     Temp  Av.8 °F (36.6 °C)  Min: 97 °F (36.1 °C)  Max: 99.3 °F (37.4 °C)  Pulse  Av.1  Min: 74  Max: 89  BP  Min: 94/53  Max: 177/60  SpO2  Av.7 %  Min: 97 %  Max: 100 %  FiO2   Av.9 %  Min: 20 %  Max: 89 %    Intake/Output Summary (Last 24 hours) at 18 1258  Last data filed at 18 5142 Gross per 24 hour   Intake          2489.25 ml   Output             1675 ml   Net           814.25 ml       · Telemetry: Sinus rhythm   · Constitutional: Oriented. No distress. · Head: Normocephalic and atraumatic. · Mouth/Throat: Oropharynx is clear and moist.   · Eyes: Conjunctivae normal. EOM are normal.   · Neck: Neck supple. No rigidity. No JVD present. · Cardiovascular: Normal rate, regular rhythm, S1&S2. · Pulmonary/Chest: Bilateral respiratory sounds. No wheezes, No rhonchi. · Abdominal: Soft. Bowel sounds present. No distension, No tenderness. · Musculoskeletal: No tenderness. No edema    · Lymphadenopathy: Has no cervical adenopathy. · Neurological: Alert and oriented. Cranial nerve appears intact, No Gross deficit   · Skin: Skin is warm and dry. No rash noted. · Psychiatric: Has a normal behavior     Labs, diagnostic and imaging results reviewed. Reviewed. Recent Labs      11/21/18   1747   NA  138   K  4.0   CL  102   CO2  25   BUN  12   CREATININE  1.5*     Recent Labs      11/21/18   1747  11/22/18   0030  11/22/18   0808  11/22/18   1623   WBC  8.5   --    --    --    HGB  9.0*  8.3*  8.2*  8.4*   HCT  27.7*  24.7*  25.3*  26.0*   MCV  93.3   --    --    --    PLT  265   --    --    --      Lab Results   Component Value Date    CKTOTAL 109 08/08/2010    CKMB 1.39 05/15/2010    TROPONINI <0.01 10/09/2018     Estimated Creatinine Clearance: 33 mL/min (A) (based on SCr of 1.5 mg/dL (H)).    No results found for: BNP  Lab Results   Component Value Date    PROTIME 14.4 11/21/2018    PROTIME 10.8 10/09/2018    PROTIME 16.6 08/13/2016    INR 1.26 11/21/2018    INR 0.95 10/09/2018    INR 1.45 08/13/2016     Lab Results   Component Value Date    CHOL 197 10/09/2018    HDL 51 10/09/2018    HDL 36 02/18/2012    TRIG 167 10/09/2018       Scheduled Meds:   atorvastatin  40 mg Oral Nightly    insulin lispro  0-6 Units Subcutaneous TID WC    insulin lispro  0-3 Units Subcutaneous Nightly    sodium chloride flush  10 mL Intravenous 2 times per day    pantoprazole  40 mg Intravenous Q12H    And    sodium chloride (PF)  10 mL Intravenous Q12H     Continuous Infusions:   sodium chloride 75 mL/hr at 11/23/18 0915    dextrose       PRN Meds:glucose, dextrose, glucagon (rDNA), dextrose, sodium chloride flush, ondansetron     Patient Active Problem List    Diagnosis Date Noted    Coronary artery disease 08/21/2011     Priority: High    Mixed hyperlipidemia      Priority: High    COPD (chronic obstructive pulmonary disease) ([facility] 04/16/2015     Priority: Low    Arthritis 04/16/2015     Priority: Low    Ataxia 04/16/2015     Priority: Low    GERD (gastroesophageal reflux disease) 04/16/2015     Priority: Low    Diabetic retinopathy ([address] 01/03/2013     Priority: Low    DDD (degenerative disc disease), lumbosacral 11/28/2012     Priority: Low    Diabetic peripheral neuropathy ([address] 11/28/2012     Priority: Low    Breast cancer, left breast ([facility] 02/27/2012     Priority: Low    Smoking 09/08/2011     Priority: Low    Uncontrolled type 2 diabetes mellitus with hyperglycemia, with long-term current use of insulin ([address] 05/22/2011     Priority: Low    Neuropathy      Priority: Low    History of atrial fibrillation     History of CVA (cerebrovascular accident)     Gastrointestinal hemorrhage 11/21/2018    Cerebral infarction due to embolism of other cerebral artery (HCC)     Ischemic stroke ([address] 10/09/2018    Atherosclerosis of native artery of left lower extremity with ulceration of midfoot ([address] 08/22/2017    Diabetic ulcer of toe of left foot associated with type 2 diabetes mellitus ([address]     Chronic idiopathic constipation 04/24/2017    Decreased pulses in feet     Diabetic ulcer of left foot associated with type 2 diabetes mellitus ([address]     Paresis ([address] 08/13/2016    Arterial ischemic stroke, ICA, left, acute ([address]     DM (diabetes mellitus), secondary, uncontrolled, w/neurologic

## 2018-11-23 NOTE — PROGRESS NOTES
Bedside report completed, patient resting comfortably, up to bedside commode and returned to bed. Will continue to monitor. Bed alarm on and call light in reach.    Neda Templeton

## 2018-11-23 NOTE — ANESTHESIA PRE PROCEDURE
Weight:   141 lb 9.6 oz (64.2 kg)    Height:                                                  BP Readings from Last 3 Encounters:   11/23/18 (!) 176/97   10/30/18 105/79   10/12/18 (!) 145/86       NPO Status:                                                                                 BMI:   Wt Readings from Last 3 Encounters:   11/23/18 141 lb 9.6 oz (64.2 kg)   10/30/18 145 lb (65.8 kg)   10/12/18 151 lb 4.8 oz (68.6 kg)     Body mass index is 24.31 kg/m².     CBC:   Lab Results   Component Value Date    WBC 8.5 11/21/2018    RBC 2.97 11/21/2018    HGB 8.4 11/22/2018    HCT 26.0 11/22/2018    MCV 93.3 11/21/2018    RDW 14.8 11/21/2018     11/21/2018       CMP:   Lab Results   Component Value Date     11/21/2018    K 4.0 11/21/2018     11/21/2018    CO2 25 11/21/2018    BUN 12 11/21/2018    CREATININE 1.5 11/21/2018    GFRAA 42 11/21/2018    GFRAA >60 12/02/2012    AGRATIO 0.8 11/21/2018    LABGLOM 35 11/21/2018    GLUCOSE 150 11/21/2018    PROT 7.3 11/21/2018    PROT 7.7 12/02/2012    CALCIUM 9.3 11/21/2018    BILITOT <0.2 11/21/2018    ALKPHOS 100 11/21/2018    AST 16 11/21/2018    ALT 11 11/21/2018       POC Tests:   Recent Labs      11/23/18   0701   POCGLU  90       Coags:   Lab Results   Component Value Date    PROTIME 14.4 11/21/2018    INR 1.26 11/21/2018    APTT 34.0 10/09/2018       HCG (If Applicable): No results found for: PREGTESTUR, PREGSERUM, HCG, HCGQUANT     ABGs: No results found for: PHART, PO2ART, PPY8PRP, DPD5NNR, BEART, T5DYWURJ     Type & Screen (If Applicable):  No results found for: LABABO, LABRH    Anesthesia Evaluation    Airway: Mallampati: II  TM distance: >3 FB   Neck ROM: full  Mouth opening: > = 3 FB Dental:          Pulmonary:   (+) COPD:                             Cardiovascular:    (+) hypertension:, CAD:,         Rhythm: regular  Rate: normal                    Neuro/Psych:   (+) CVA:, neuromuscular disease:, psychiatric history: GI/Hepatic/Renal:   (+) GERD:,           Endo/Other:    (+) Diabetes, . Abdominal:         (-) obese     Vascular:                                        Anesthesia Plan      MAC     ASA 3       Induction: intravenous. Anesthetic plan and risks discussed with patient. Plan discussed with CRNA.                   Sarita Quezada MD   11/23/2018

## 2018-11-23 NOTE — CARE COORDINATION
Discharge Planning Assessment  RN/SW discharge planner met with patient/(and family member) to discuss reason for admission, current living situation, and potential needs at the time of discharge    Demographics/Insurance verified Yes    Current type of [address]in[address] [place] [id number]    Patient from ECF/SW confirmed with:    Living arrangements:home with spouse    Level of function/Support:    DME:    Active with any community resources/agencies/skilled home care: Active with [facility]    Transportation at the time of discharge:    Tentative discharge plan: Home with spouse, and [facility] pending a home care order.

## 2018-11-23 NOTE — PROGRESS NOTES
Pt arrived from Endo to PACU bay 4. Report received from Endo staff. Pt arouses to voice. 100% on 2 L NC, NSR, VSS.  Relay Alexander. Will continue to monitor.

## 2018-11-23 NOTE — PLAN OF CARE
Problem: Risk for Impaired Skin Integrity  Goal: Tissue integrity - skin and mucous membranes  Structural intactness and normal physiological function of skin and  mucous membranes. To commode & back to bed with maximum assistance of 2, remains unsteady & weak. Rosa care given, no skin breakdown noted other than old scabs & bruises on lower legs present on admission. Will continue to monitor. Les Mac RN, BSN, PCCN. Problem: Fluid Volume - Imbalance:  Goal: Will show no signs and symptoms of excessive bleeding  Will show no signs and symptoms of excessive bleeding   Hemoglobin 8.4. Will continue to monitor. Les Mac RN, BSN, PCCN.

## 2018-11-23 NOTE — OP NOTE
OK for dc home from GI perspective     - pt should have outpt f/u for colonoscopy. - May resume Eliquis tomorrow.      Jaki Canales MD  [facility]

## 2018-11-23 NOTE — PROGRESS NOTES
Assessment completed and medications for the night given. Patient sleeping at this time. Bed alarm on and call light in reach. Will continue to monitor.    Jeffy Dalal

## 2018-11-24 VITALS
TEMPERATURE: 97.1 F | RESPIRATION RATE: 18 BRPM | DIASTOLIC BLOOD PRESSURE: 68 MMHG | WEIGHT: 138.8 LBS | SYSTOLIC BLOOD PRESSURE: 126 MMHG | HEIGHT: 64 IN | OXYGEN SATURATION: 100 % | HEART RATE: 75 BPM | BODY MASS INDEX: 23.7 KG/M2

## 2018-11-24 LAB
BASOPHILS ABSOLUTE: 0.1 K/UL (ref 0–0.2)
BASOPHILS RELATIVE PERCENT: 0.6 %
EOSINOPHILS ABSOLUTE: 0.3 K/UL (ref 0–0.6)
EOSINOPHILS RELATIVE PERCENT: 3 %
GLUCOSE BLD-MCNC: 105 MG/DL (ref 70–99)
GLUCOSE BLD-MCNC: 148 MG/DL (ref 70–99)
GLUCOSE BLD-MCNC: 148 MG/DL (ref 70–99)
HCT VFR BLD CALC: 31 % (ref 36–48)
HEMOGLOBIN: 10.3 G/DL (ref 12–16)
LYMPHOCYTES ABSOLUTE: 1.1 K/UL (ref 1–5.1)
LYMPHOCYTES RELATIVE PERCENT: 12.6 %
MCH RBC QN AUTO: 30.4 PG (ref 26–34)
MCHC RBC AUTO-ENTMCNC: 33.3 G/DL (ref 31–36)
MCV RBC AUTO: 91.5 FL (ref 80–100)
MONOCYTES ABSOLUTE: 0.5 K/UL (ref 0–1.3)
MONOCYTES RELATIVE PERCENT: 5.6 %
NEUTROPHILS ABSOLUTE: 6.8 K/UL (ref 1.7–7.7)
NEUTROPHILS RELATIVE PERCENT: 78.2 %
PDW BLD-RTO: 14.5 % (ref 12.4–15.4)
PERFORMED ON: ABNORMAL
PLATELET # BLD: 220 K/UL (ref 135–450)
PMV BLD AUTO: 8.6 FL (ref 5–10.5)
RBC # BLD: 3.38 M/UL (ref 4–5.2)
WBC # BLD: 8.7 K/UL (ref 4–11)

## 2018-11-24 PROCEDURE — 6360000002 HC RX W HCPCS: Performed by: INTERNAL MEDICINE

## 2018-11-24 PROCEDURE — C9113 INJ PANTOPRAZOLE SODIUM, VIA: HCPCS | Performed by: INTERNAL MEDICINE

## 2018-11-24 PROCEDURE — 2580000003 HC RX 258: Performed by: INTERNAL MEDICINE

## 2018-11-24 PROCEDURE — 6370000000 HC RX 637 (ALT 250 FOR IP): Performed by: CLINICAL NURSE SPECIALIST

## 2018-11-24 PROCEDURE — 85025 COMPLETE CBC W/AUTO DIFF WBC: CPT

## 2018-11-24 PROCEDURE — 99232 SBSQ HOSP IP/OBS MODERATE 35: CPT | Performed by: CLINICAL NURSE SPECIALIST

## 2018-11-24 RX ORDER — DILTIAZEM HYDROCHLORIDE 180 MG/1
180 CAPSULE, COATED, EXTENDED RELEASE ORAL DAILY
Status: DISCONTINUED | OUTPATIENT
Start: 2018-11-24 | End: 2018-11-24 | Stop reason: HOSPADM

## 2018-11-24 RX ORDER — BUPROPION HYDROCHLORIDE 150 MG/1
150 TABLET ORAL DAILY
COMMUNITY
Start: 2018-10-18

## 2018-11-24 RX ORDER — LOSARTAN POTASSIUM 25 MG/1
12.5 TABLET ORAL DAILY
COMMUNITY
Start: 2018-10-18

## 2018-11-24 RX ORDER — PANTOPRAZOLE SODIUM 40 MG/1
40 TABLET, DELAYED RELEASE ORAL ONCE
Status: DISCONTINUED | OUTPATIENT
Start: 2018-11-24 | End: 2018-11-24 | Stop reason: HOSPADM

## 2018-11-24 RX ORDER — DILTIAZEM HYDROCHLORIDE 180 MG/1
180 CAPSULE, COATED, EXTENDED RELEASE ORAL ONCE
Status: DISCONTINUED | OUTPATIENT
Start: 2018-11-24 | End: 2018-11-24 | Stop reason: HOSPADM

## 2018-11-24 RX ORDER — DILTIAZEM HYDROCHLORIDE 180 MG/1
180 CAPSULE, COATED, EXTENDED RELEASE ORAL DAILY
Qty: 30 CAPSULE | Refills: 1 | Status: SHIPPED | OUTPATIENT
Start: 2018-11-24 | End: 2019-05-06 | Stop reason: SDUPTHER

## 2018-11-24 RX ADMIN — DILTIAZEM HYDROCHLORIDE 180 MG: 180 CAPSULE, COATED, EXTENDED RELEASE ORAL at 08:45

## 2018-11-24 RX ADMIN — APIXABAN 5 MG: 5 TABLET, FILM COATED ORAL at 08:45

## 2018-11-24 RX ADMIN — Medication 10 ML: at 08:45

## 2018-11-24 RX ADMIN — PANTOPRAZOLE SODIUM 40 MG: 40 INJECTION, POWDER, FOR SOLUTION INTRAVENOUS at 06:50

## 2018-11-24 RX ADMIN — Medication 10 ML: at 16:33

## 2018-11-24 RX ADMIN — PANTOPRAZOLE SODIUM 40 MG: 40 INJECTION, POWDER, FOR SOLUTION INTRAVENOUS at 16:34

## 2018-11-24 RX ADMIN — INSULIN LISPRO 1 UNITS: 100 INJECTION, SOLUTION INTRAVENOUS; SUBCUTANEOUS at 12:36

## 2018-11-24 ASSESSMENT — PAIN SCALES - GENERAL
PAINLEVEL_OUTOF10: 0

## 2018-11-24 NOTE — PROGRESS NOTES
displayed. BMP:  Recent Labs      11/21/18   1747  11/23/18   1300   NA  138  137   K  4.0  3.8   CO2  25  23   BUN  12  7   CREATININE  1.5*  1.0     INR:    Recent Labs      11/21/18   1747   INR  1.26*     BNP:  No results for input(s): PROBNP in the last 72 hours. Diagnostics:    Echo 10/11/18  Summary   -Normal left ventricle size, wall thickness and systolic function with an   estimated ejection fraction of 65%. No regional wall motion abnormalities   are seen.   -Normal diastolic function. -Mild to moderate mitral regurgitation.   -Trivial tricuspid regurgitation    Assessment:    1. GIB/anemia, AVMS with APC ablation  2. Atrial fib, on eliquis, chadds2 vasc 5  3. Hypertension, elevated      Plan:    1. Plans with outpatient colonoscopy  2. Outpatient follow up with Dr Brett Lan for possible watchman in December  3. Increase cardizem 180 mg daily  4. Resume eliquis 5 mg bid     Ok for discharge from cardiology standpoint    Active with [facility] care    Discussed with patient who is agreeable with plan of care. Thank you for allowing me to participate in the care of your patient.     Ambreen Almanza, CNS

## 2018-11-24 NOTE — CARE COORDINATION
Faxed home care orders to [facility].  Electronically signed by GRACIA Gaines, SHITAL on 11/24/2018 at 2:30 PM

## 2018-11-24 NOTE — PLAN OF CARE
Problem: Falls - Risk of:  Goal: Will remain free from falls  Will remain free from falls   Steady with ambulation with front wheel walker & minimal assistance.  & sister @ bedside. Data- discharge order received, pt verbalized agreement to discharge, needs for [facility] with [facility] for PT/OT/NSG, JA reviewed and signed by MD, completed by RN. Action- AVS prepared, discharge instructions prepared and given to the patient'S  & sister, medication information packet given r/t NEW or CHANGED prescriptions, pt verbalized understanding further self-review. D/C instruction summary: Diet- ccc, Activity- up daily with wakler & assistance, follow up with Primary Care Physician Kishan Chilel [phone] appointment 1 week, immunizations reviewed and declined, medications prescriptions to be filled @ the patient's pharmacy monday. Inpatient surgical procedural reviewed: EGD. Contact information provided to above agencies used. Response- Case Management/ reported faxing completed JA and AVS to needed HHC/DME services stated above. Pt belongings gathered, telemetry & PAC IV removed, pt dressed in own clothes. Gustavo Yoon RN, BSN, PCCN.

## 2018-11-24 NOTE — PROGRESS NOTES
Disposition is home with HHC/DME as stated above, transported with  epr car, taken to lobby via w/c with RN, no complications. Randall Funez RN, BSN, PCCN.

## 2018-11-24 NOTE — DISCHARGE SUMMARY
[facility]    [facility] Demographics    Patient. Tariq Swan  Date of Birth. 1955  MRN. [id number]     Primary care provider. Barbara Shanks  (Tel: [phone])    Admit date: 11/21/2018    Discharge date (blank if same as Note Date): Note Date: 11/24/2018     Reason for Hospitalization. Chief Complaint   Patient presents with    Abnormal Test Results     pt sent in by PCP c/o pts hemoglobin dropped from 11 to 8.3 in a month. Denies any pain or bleeding         Significant Findings. Active Problems:    Gastrointestinal hemorrhage    History of atrial fibrillation    History of CVA (cerebrovascular accident)    Chronic atrial fibrillation ([facility]       Problems and results from this hospitalization that need follow up. 1. UGI bleeding  2. Anemia  3. afib  GI bleeding from PUD due to Eliquis induced coagulopathy. Significant test results and incidental findings.   EGD by Dr Katerin Hodges 11/23  Impression:  - small cherry red spots in the duodenum and stomach, couple were oozing small amount of blood. ? Small hemangiomas/avms. Ablated with APC.       Recommendations:   - PPI daily x 2 months                                      - OK for dc home from GI perspective                                      - pt should have outpt f/u for colonoscopy. - May resume Eliquis tomorrow. Invasive procedures and treatments. 1. None     Problem-based Hospital Course. Patient is a 76-year-old [place] American female with history of atrial fibrillation previous stroke, dementia who was sent to the hospital by her primary care physician for evaluation of anemia. Patient's hemoglobin had dropped approximately 2.7 g since October. In the ED she was noted to have some dark stool. She is on Eliquis for her history of atrial flutter ablation.   She was admitted and hydrated. She was seen by GI and underwent EGD which revealed several small cherry red spots in the duodenum and stomach which were oozing a small amount of blood. It was not clear whether these were small hemangiomas or a AVMs. They were ablated with APC. Her hemoglobin did drop to 7 and she was transfused 1 unit of blood however her hemoglobin went up to 10.3. Her Eliquis was restarted on the morning of discharge after being okayed by GI. She was seen by cardiology during her stay. She will follow up with Dr. Tania Padron in the office to consider possible watchman procedure. She was discharged home in stable condition. Consults. IP CONSULT TO HOSPITALIST  IP CONSULT TO CARDIOLOGY  IP CONSULT TO GI  IP CONSULT TO HOME CARE NEEDS    Physical examination on discharge day. /62   Pulse 92   Temp 98.2 °F (36.8 °C) (Temporal)   Resp 18   Ht 5' 4\" (1.626 m)   Wt 138 lb 12.8 oz (63 kg)   SpO2 100%   BMI 23.82 kg/m²   General appearance. Alert. Looks comfortable. HEENT. Sclera clear. Moist mucus membranes. Cardiovascular. Regular rate and rhythm, normal S1, S2. No murmur. Respiratory. Not using accessory muscles. Clear to auscultation bilaterally, no wheeze. Gastrointestinal. Abdomen soft, non-tender, not distended, normal bowel sounds  Neurology. Facial symmetry. No speech deficits. Moving all extremities equally. Extremities. No edema in lower extremities. Skin. Warm, dry, normal turgor    Condition at time of discharge stable    Medication instructions provided to patient at discharge.      Medication List      START taking these medications    diltiazem 180 MG extended release capsule  Commonly known as:  CARDIZEM CD  Take 1 capsule by mouth daily     pantoprazole 40 MG tablet  Commonly known as:  PROTONIX  Take 1 tablet by mouth daily        CHANGE how you take these medications    ergocalciferol 13737 units capsule  Commonly known as:  ERGOCALCIFEROL  Take 1 capsule by mouth once a week  What changed:  additional instructions        CONTINUE taking these medications    Alcohol Wipes Pads  Use with testing three times daily     apixaban 5 MG Tabs tablet  Commonly known as:  ELIQUIS  Take 1 tablet by mouth 2 times daily     aspirin 81 MG EC tablet  Take 1 tablet by mouth daily     atorvastatin 40 MG tablet  Commonly known as:  LIPITOR  Take 1 tablet by mouth nightly     Blood Glucose System Marcelo Kit  Test three times daily     blood glucose test strips  Use as directed - test three times daily     insulin glargine 300 UNIT/ML injection pen  Commonly known as:  TOUJEO SOLOSTAR  Inject 10 Units into the skin nightly     * insulin lispro 100 UNIT/ML pen  Commonly known as:  HUMALOG  Inject 0-10 Units into the skin 3 times daily (before meals) Less than 130 - no insulin; 130-150 = 5 units, greater than 150 = 10 units     * insulin lispro 100 UNIT/ML injection vial  Commonly known as:  HUMALOG     Lancets Misc  Use for each blood sugar test     traZODone 50 MG tablet  Commonly known as:  DESYREL        * This list has 2 medication(s) that are the same as other medications prescribed for you. Read the directions carefully, and ask your doctor or other care provider to review them with you. STOP taking these medications    amLODIPine 5 MG tablet  Commonly known as:  NORVASC     esomeprazole Magnesium 20 MG Pack  Commonly known as:  Oleksandr Reyes           Where to Get Your Medications      You can get these medications from any pharmacy    Bring a paper prescription for each of these medications  · diltiazem 180 MG extended release capsule  · pantoprazole 40 MG tablet     Information about where to get these medications is not yet available    Ask your nurse or doctor about these medications  · apixaban 5 MG Tabs tablet         Discharge recommendations given to patient. Follow Up. PCP in 1 week   Disposition. home  Activity.  activity as tolerated  Diet: DIET CARB CONTROL;      Spent 40 minutes in discharge process. Signed:   Ayan Myers PA-C     11/24/2018 12:49 PM

## 2018-11-24 NOTE — PLAN OF CARE
Problem: Falls - Risk of:  Goal: Will remain free from falls  Will remain free from falls   Bed in lowest position, wheels locked, side rails up times 3, bed alarm in place, door open, encouraged to use call light for needs, phone and call light are within reach. Will continue to monitor. Jeanie Roque RN, BSN         Problem: Fluid Volume - Imbalance:  Goal: Will show no signs and symptoms of excessive bleeding  Will show no signs and symptoms of excessive bleeding   Hemoglobin this morning 10.3. Blood pressure 132/75. Will continue to monitor. Jeanie Roque RN, BSN, PCCN.

## 2018-11-24 NOTE — DISCHARGE INSTR - DIET
 Good nutrition is important when healing from an illness, injury, or surgery. Follow any nutrition recommendations given to you during your hospital stay.  If you were given an oral nutrition supplement while in the hospital, continue to take this supplement at home. You can take it with meals, in-between meals, and/or before bedtime. These supplements can be purchased at most local grocery stores, pharmacies, and chain super-stores.  If you have any questions about your diet or nutrition, call the hospital and ask for the dietitian.     Diabetic; low concentrated sweets, 4 carbohydrate units = 40 to 60 grams carbohydrates per meal

## 2018-11-24 NOTE — PROGRESS NOTES
Patient with elevated blood pressure overnight. One time dose of hydralazine ordered and given. BP improved but remains elevated. Patient up to bsc x2 overnight. Tolerated infusion of blood w/o complication.

## 2018-11-26 NOTE — PROGRESS NOTES
11-25-18 received call from the patient's sister Sidney Tesfaye @ 1915 that [facility] never called to schedule first home care visit so Shara called them & [facility] told Shara [facility]. I called [facility] to clarify situation & re-faxed JA with face sheet to [facility]. [facility]  verbalized understanding. & stated he would have his  if there were any problems or questions. Deedee Hernández RN, BSN, PCCN.

## 2018-11-27 ENCOUNTER — TELEPHONE (OUTPATIENT)
Dept: INPATIENT UNIT | Age: 63
End: 2018-11-27

## 2018-12-08 ENCOUNTER — HOSPITAL ENCOUNTER (EMERGENCY)
Age: 63
Discharge: HOME | End: 2018-12-08
Attending: EMERGENCY MEDICINE
Payer: MEDICARE

## 2018-12-08 VITALS
OXYGEN SATURATION: 100 % | RESPIRATION RATE: 16 BRPM | HEIGHT: 64 IN | BODY MASS INDEX: 23.56 KG/M2 | HEART RATE: 79 BPM | SYSTOLIC BLOOD PRESSURE: 107 MMHG | WEIGHT: 138 LBS | TEMPERATURE: 98 F | DIASTOLIC BLOOD PRESSURE: 65 MMHG

## 2018-12-08 DIAGNOSIS — S39.012A STRAIN OF LUMBAR REGION, INITIAL ENCOUNTER: Primary | ICD-10-CM

## 2018-12-08 LAB
BILIRUBIN URINE: ABNORMAL
BLOOD, URINE: NEGATIVE
CLARITY: ABNORMAL
COLOR: YELLOW
EPITHELIAL CELLS, UA: 4 /HPF (ref 0–5)
GLUCOSE URINE: NEGATIVE MG/DL
KETONES, URINE: NEGATIVE MG/DL
LEUKOCYTE ESTERASE, URINE: NEGATIVE
MICROSCOPIC EXAMINATION: YES
NITRITE, URINE: NEGATIVE
PH UA: 5.5
PROTEIN UA: >=300 MG/DL
RBC UA: 4 /HPF (ref 0–4)
SPECIFIC GRAVITY UA: 1.02
URINE REFLEX TO CULTURE: ABNORMAL
URINE TYPE: ABNORMAL
UROBILINOGEN, URINE: 1 E.U./DL
WBC UA: 2 /HPF (ref 0–5)

## 2018-12-08 PROCEDURE — 6360000002 HC RX W HCPCS: Performed by: NURSE PRACTITIONER

## 2018-12-08 PROCEDURE — 96374 THER/PROPH/DIAG INJ IV PUSH: CPT

## 2018-12-08 PROCEDURE — 36415 COLL VENOUS BLD VENIPUNCTURE: CPT

## 2018-12-08 PROCEDURE — 99283 EMERGENCY DEPT VISIT LOW MDM: CPT

## 2018-12-08 PROCEDURE — 81001 URINALYSIS AUTO W/SCOPE: CPT

## 2018-12-08 RX ORDER — METHOCARBAMOL 750 MG/1
750 TABLET, FILM COATED ORAL 4 TIMES DAILY
Qty: 40 TABLET | Refills: 0 | Status: SHIPPED | OUTPATIENT
Start: 2018-12-08 | End: 2018-12-18

## 2018-12-08 RX ORDER — KETOROLAC TROMETHAMINE 30 MG/ML
30 INJECTION, SOLUTION INTRAMUSCULAR; INTRAVENOUS ONCE
Status: COMPLETED | OUTPATIENT
Start: 2018-12-08 | End: 2018-12-08

## 2018-12-08 RX ORDER — LIDOCAINE 50 MG/G
1 PATCH TOPICAL DAILY
Qty: 30 PATCH | Refills: 0 | Status: SHIPPED | OUTPATIENT
Start: 2018-12-08 | End: 2019-12-03 | Stop reason: ALTCHOICE

## 2018-12-08 RX ORDER — NAPROXEN 500 MG/1
500 TABLET ORAL 2 TIMES DAILY WITH MEALS
Qty: 30 TABLET | Refills: 0 | Status: SHIPPED | OUTPATIENT
Start: 2018-12-08 | End: 2019-02-04 | Stop reason: SINTOL

## 2018-12-08 RX ADMIN — KETOROLAC TROMETHAMINE 30 MG: 30 INJECTION, SOLUTION INTRAMUSCULAR at 17:45

## 2018-12-08 ASSESSMENT — ENCOUNTER SYMPTOMS
SORE THROAT: 0
RHINORRHEA: 0
ABDOMINAL PAIN: 0
DIARRHEA: 0
BACK PAIN: 1
SHORTNESS OF BREATH: 0
NAUSEA: 0
VOMITING: 0
CONSTIPATION: 0
BLOOD IN STOOL: 0

## 2018-12-08 ASSESSMENT — PAIN SCALES - GENERAL: PAINLEVEL_OUTOF10: 9

## 2018-12-08 NOTE — ED PROVIDER NOTES
[address] [facility]  eMERGENCY dEPARTMENT eNCOUnter        Pt Name: Nancie Juan  MRN: [id number]  [place] 1955  Date of evaluation: 12/8/2018  Provider: GREGOR Zapien - CNP  PCP: Nito Uribe       Chief Complaint   Patient presents with    Back Pain     pt c/o left sided flank/back pain - ongoing for past 3 days- no difficulty urinating, no n/v/d       HISTORY OF PRESENT ILLNESS   (Location/Symptom, Timing/Onset,Context/Setting, Quality, Duration, Modifying Factors, Severity)  Note limiting factors. Nancie Juan is a 58 y.o. female who presents emergency Department with concern for left flank pain. Present for 1 day. She is tried no over-the-counter prescribed remedies for symptoms. She denies dysuria or hematuria. No history of kidney stones. She also denies fever, rash, headaches, dizziness, chest pain, shortness of breath, cough, congestion, abdominal pain, nausea, vomiting, diarrhea, constipation, or blood in the stool. Nursing Notes triage note reviewed and agreed with or any disagreements were addressed  in the HPI. REVIEW OF SYSTEMS    (2-9 systems for level 4, 10 or more for level 5)     Review of Systems   Constitutional: Negative for chills and fever. HENT: Negative for postnasal drip, rhinorrhea and sore throat. Eyes: Negative for visual disturbance. Respiratory: Negative for shortness of breath. Cardiovascular: Negative for chest pain. Gastrointestinal: Negative for abdominal pain, blood in stool, constipation, diarrhea, nausea and vomiting. Genitourinary: Positive for flank pain. Negative for dysuria and hematuria. Musculoskeletal: Positive for back pain. Skin: Negative for rash. Neurological: Negative for weakness and headaches. All other systems reviewed and are negative. Positives and Pertinent negatives as per HPI.   Except as noted above in the ROS, all other systems BUPROPION (WELLBUTRIN XL) 150 MG EXTENDED RELEASE TABLET    Take 150 mg by mouth daily    DILTIAZEM (CARDIZEM CD) 180 MG EXTENDED RELEASE CAPSULE    Take 1 capsule by mouth daily    ERGOCALCIFEROL (ERGOCALCIFEROL) 64188 UNITS CAPSULE    Take 1 capsule by mouth once a week    GLUCOSE BLOOD (BLOOD GLUCOSE TEST STRIPS) STRP    Use as directed - test three times daily    INSULIN GLARGINE (TOUJEO SOLOSTAR) 300 UNIT/ML INJECTION PEN    Inject 10 Units into the skin nightly    INSULIN LISPRO (HUMALOG) 100 UNIT/ML INJECTION VIAL    Inject into the skin 3 times daily (before meals)    INSULIN LISPRO (HUMALOG) 100 UNIT/ML PEN    Inject 0-10 Units into the skin 3 times daily (before meals) Less than 130 - no insulin; 130-150 = 5 units, greater than 150 = 10 units    LANCETS MISC    Use for each blood sugar test    LOSARTAN (COZAAR) 25 MG TABLET    Take 12.5 mg by mouth nightly    PANTOPRAZOLE (PROTONIX) 40 MG TABLET    Take 1 tablet by mouth daily    TRAZODONE (DESYREL) 50 MG TABLET    Take 50 mg by mouth nightly         ALLERGIES     Lyrica [pregabalin] and Metformin    FAMILY HISTORY       Family History   Problem Relation Age of Onset    Heart Disease Mother     Diabetes Mother     Diabetes Father     Cancer Maternal Grandmother         Breast          SOCIAL HISTORY       Social History     Social History    Marital status:      Spouse name: Benjamin Corado Number of children: 2    Years of education: N/A     Occupational History    imagining technician [employer]     Social History Main Topics    Smoking status: Current Every Day Smoker     Packs/day: 1.00     Years: 40.00     Types: Cigarettes     Last attempt to quit: 8/15/2016    Smokeless tobacco: Never Used    Alcohol use No    Drug use: No    Sexual activity: Not Currently      Comment:  from  but not legal separation. Other Topics Concern    None     Social History Narrative    Lives with son.        SCREENINGS PHYSICAL EXAM  (up to 7 for level 4, 8 or more for level 5)     ED Triage Vitals [12/08/18 1449]   BP Temp Temp Source Pulse Resp SpO2 Height Weight   118/79 97.3 °F (36.3 °C) Infrared 91 16 100 % 5' 4\" (1.626 m) 138 lb (62.6 kg)       Physical Exam   Constitutional: She is oriented to person, place, and time. She appears well-developed and well-nourished. No distress. HENT:   Head: Normocephalic and atraumatic. Eyes: Right eye exhibits no discharge. Left eye exhibits no discharge. Neck: Normal range of motion. Cardiovascular: Intact distal pulses. Pulmonary/Chest: Effort normal. No stridor. No respiratory distress. Musculoskeletal: Normal range of motion. She exhibits no edema, tenderness or deformity. Right ankle: She exhibits normal pulse. Left ankle: She exhibits normal pulse. Cervical back: Normal.        Thoracic back: Normal.        Lumbar back: Normal.        Right hand: She exhibits normal capillary refill. Left hand: She exhibits normal capillary refill. Neurological: She is alert and oriented to person, place, and time. She has normal strength and normal reflexes. She displays normal reflexes. No cranial nerve deficit or sensory deficit. She exhibits normal muscle tone. Coordination and gait normal. GCS eye subscore is 4. GCS verbal subscore is 5. GCS motor subscore is 6. Reflex Scores:       Patellar reflexes are 2+ on the right side and 2+ on the left side. Achilles reflexes are 2+ on the right side and 2+ on the left side. Skin: Skin is warm and dry. She is not diaphoretic. No pallor. No evidence of lesions, erythema, ecchymosis, or rash to back. Psychiatric: She has a normal mood and affect. Her behavior is normal.   Nursing note and vitals reviewed.       DIAGNOSTIC RESULTS   LABS:    Labs Reviewed   URINE RT REFLEX TO CULTURE - Abnormal; Notable for the following:        Result Value    Clarity, UA CLOUDY (*)     Bilirubin Urine SMALL (*)

## 2018-12-20 ENCOUNTER — HOSPITAL ENCOUNTER (EMERGENCY)
Age: 63
Discharge: HOME | End: 2018-12-21
Attending: EMERGENCY MEDICINE
Payer: MEDICARE

## 2018-12-20 ENCOUNTER — APPOINTMENT (OUTPATIENT)
Dept: GENERAL RADIOLOGY | Age: 63
End: 2018-12-20
Payer: MEDICARE

## 2018-12-20 ENCOUNTER — APPOINTMENT (OUTPATIENT)
Dept: CT IMAGING | Age: 63
End: 2018-12-20
Payer: MEDICARE

## 2018-12-20 DIAGNOSIS — E86.0 DEHYDRATION: Primary | ICD-10-CM

## 2018-12-20 DIAGNOSIS — R26.2 DIFFICULTY WALKING: ICD-10-CM
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
## 2018-12-20 DIAGNOSIS — S09.90XA CLOSED HEAD INJURY, INITIAL ENCOUNTER: ICD-10-CM

## 2018-12-20 LAB
BACTERIA: ABNORMAL /HPF
BILIRUBIN URINE: NEGATIVE
BLOOD, URINE: ABNORMAL
CLARITY: ABNORMAL
COLOR: YELLOW
EPITHELIAL CELLS, UA: 9 /HPF (ref 0–5)
GLUCOSE URINE: NEGATIVE MG/DL
HYALINE CASTS: 8 /LPF (ref 0–8)
KETONES, URINE: NEGATIVE MG/DL
LEUKOCYTE ESTERASE, URINE: ABNORMAL
MICROSCOPIC EXAMINATION: YES
NITRITE, URINE: NEGATIVE
PH UA: 5
PROTEIN UA: 100 MG/DL
RBC UA: 15 /HPF (ref 0–4)
SPECIFIC GRAVITY UA: 1.02
URINE REFLEX TO CULTURE: YES
URINE TYPE: ABNORMAL
UROBILINOGEN, URINE: 1 E.U./DL
WBC UA: 6 /HPF (ref 0–5)

## 2018-12-20 PROCEDURE — 87086 URINE CULTURE/COLONY COUNT: CPT

## 2018-12-20 PROCEDURE — 70450 CT HEAD/BRAIN W/O DYE: CPT

## 2018-12-20 PROCEDURE — 72125 CT NECK SPINE W/O DYE: CPT

## 2018-12-20 PROCEDURE — 81001 URINALYSIS AUTO W/SCOPE: CPT

## 2018-12-20 PROCEDURE — 96361 HYDRATE IV INFUSION ADD-ON: CPT

## 2018-12-20 PROCEDURE — 99285 EMERGENCY DEPT VISIT HI MDM: CPT

## 2018-12-20 PROCEDURE — 96374 THER/PROPH/DIAG INJ IV PUSH: CPT

## 2018-12-20 PROCEDURE — 73502 X-RAY EXAM HIP UNI 2-3 VIEWS: CPT

## 2018-12-20 RX ORDER — SITAGLIPTIN 25 MG/1
25 TABLET, FILM COATED ORAL
COMMUNITY
Start: 2018-12-11 | End: 2020-01-31 | Stop reason: ALTCHOICE

## 2018-12-20 ASSESSMENT — PAIN SCALES - GENERAL: PAINLEVEL_OUTOF10: 10

## 2018-12-20 ASSESSMENT — PAIN DESCRIPTION - LOCATION: LOCATION: HEAD

## 2018-12-21 ENCOUNTER — APPOINTMENT (OUTPATIENT)
Dept: CT IMAGING | Age: 63
End: 2018-12-21
Payer: MEDICARE

## 2018-12-21 ENCOUNTER — TELEPHONE (OUTPATIENT)
Dept: OTHER | Facility: CLINIC | Age: 63
End: 2018-12-21

## 2018-12-21 VITALS
DIASTOLIC BLOOD PRESSURE: 67 MMHG | WEIGHT: 148 LBS | SYSTOLIC BLOOD PRESSURE: 150 MMHG | RESPIRATION RATE: 18 BRPM | BODY MASS INDEX: 25.4 KG/M2 | OXYGEN SATURATION: 99 % | TEMPERATURE: 97.9 F | HEART RATE: 76 BPM

## 2018-12-21 LAB
A/G RATIO: 0.9 (ref 1.1–2.2)
ALBUMIN SERPL-MCNC: 3.3 G/DL (ref 3.4–5)
ALP BLD-CCNC: 87 U/L (ref 40–129)
ALT SERPL-CCNC: 10 U/L (ref 10–40)
ANION GAP SERPL CALCULATED.3IONS-SCNC: 12 MMOL/L (ref 3–16)
AST SERPL-CCNC: 21 U/L (ref 15–37)
BASOPHILS ABSOLUTE: 0.1 K/UL (ref 0–0.2)
BASOPHILS RELATIVE PERCENT: 0.9 %
BILIRUB SERPL-MCNC: <0.2 MG/DL (ref 0–1)
BILIRUBIN URINE: NEGATIVE
BLOOD, URINE: NEGATIVE
BUN BLDV-MCNC: 30 MG/DL (ref 7–20)
CALCIUM SERPL-MCNC: 9.2 MG/DL (ref 8.3–10.6)
CHLORIDE BLD-SCNC: 104 MMOL/L (ref 99–110)
CLARITY: ABNORMAL
CO2: 23 MMOL/L (ref 21–32)
COLOR: YELLOW
CREAT SERPL-MCNC: 1.4 MG/DL (ref 0.6–1.2)
EOSINOPHILS ABSOLUTE: 0.2 K/UL (ref 0–0.6)
EOSINOPHILS RELATIVE PERCENT: 1.6 %
EPITHELIAL CELLS, UA: 4 /HPF (ref 0–5)
GFR AFRICAN AMERICAN: 46
GFR NON-AFRICAN AMERICAN: 38
GLOBULIN: 3.8 G/DL
GLUCOSE BLD-MCNC: 153 MG/DL (ref 70–99)
GLUCOSE URINE: NEGATIVE MG/DL
HCT VFR BLD CALC: 25.9 % (ref 36–48)
HEMOGLOBIN: 8.4 G/DL (ref 12–16)
HYALINE CASTS: 5 /LPF (ref 0–8)
INR BLD: 1.68 (ref 0.86–1.14)
KETONES, URINE: NEGATIVE MG/DL
LEUKOCYTE ESTERASE, URINE: NEGATIVE
LYMPHOCYTES ABSOLUTE: 1.3 K/UL (ref 1–5.1)
LYMPHOCYTES RELATIVE PERCENT: 12.4 %
MCH RBC QN AUTO: 29.9 PG (ref 26–34)
MCHC RBC AUTO-ENTMCNC: 32.5 G/DL (ref 31–36)
MCV RBC AUTO: 92.1 FL (ref 80–100)
MICROSCOPIC EXAMINATION: YES
MONOCYTES ABSOLUTE: 0.6 K/UL (ref 0–1.3)
MONOCYTES RELATIVE PERCENT: 5.8 %
NEUTROPHILS ABSOLUTE: 8.3 K/UL (ref 1.7–7.7)
NEUTROPHILS RELATIVE PERCENT: 79.3 %
NITRITE, URINE: NEGATIVE
PDW BLD-RTO: 13.8 % (ref 12.4–15.4)
PH UA: 5
PLATELET # BLD: 254 K/UL (ref 135–450)
PMV BLD AUTO: 9.3 FL (ref 5–10.5)
POTASSIUM REFLEX MAGNESIUM: 4.4 MMOL/L (ref 3.5–5.1)
PRO-BNP: 100 PG/ML (ref 0–124)
PROTEIN UA: 100 MG/DL
PROTHROMBIN TIME: 19.2 SEC (ref 9.8–13)
RBC # BLD: 2.82 M/UL (ref 4–5.2)
RBC UA: 2 /HPF (ref 0–4)
SODIUM BLD-SCNC: 139 MMOL/L (ref 136–145)
SPECIFIC GRAVITY UA: 1.02
TOTAL PROTEIN: 7.1 G/DL (ref 6.4–8.2)
TROPONIN: <0.01 NG/ML
URINE REFLEX TO CULTURE: ABNORMAL
URINE TYPE: ABNORMAL
UROBILINOGEN, URINE: 1 E.U./DL
WBC # BLD: 10.4 K/UL (ref 4–11)
WBC UA: 1 /HPF (ref 0–5)

## 2018-12-21 PROCEDURE — 85025 COMPLETE CBC W/AUTO DIFF WBC: CPT

## 2018-12-21 PROCEDURE — 2500000003 HC RX 250 WO HCPCS: Performed by: NURSE PRACTITIONER

## 2018-12-21 PROCEDURE — 6360000002 HC RX W HCPCS: Performed by: NURSE PRACTITIONER

## 2018-12-21 PROCEDURE — 80053 COMPREHEN METABOLIC PANEL: CPT

## 2018-12-21 PROCEDURE — 85610 PROTHROMBIN TIME: CPT

## 2018-12-21 PROCEDURE — 2580000003 HC RX 258: Performed by: NURSE PRACTITIONER

## 2018-12-21 PROCEDURE — 36415 COLL VENOUS BLD VENIPUNCTURE: CPT

## 2018-12-21 PROCEDURE — 84484 ASSAY OF TROPONIN QUANT: CPT

## 2018-12-21 PROCEDURE — 83880 ASSAY OF NATRIURETIC PEPTIDE: CPT

## 2018-12-21 PROCEDURE — 81001 URINALYSIS AUTO W/SCOPE: CPT

## 2018-12-21 PROCEDURE — 73700 CT LOWER EXTREMITY W/O DYE: CPT

## 2018-12-21 RX ORDER — 0.9 % SODIUM CHLORIDE 0.9 %
1000 INTRAVENOUS SOLUTION INTRAVENOUS ONCE
Status: COMPLETED | OUTPATIENT
Start: 2018-12-21 | End: 2018-12-21

## 2018-12-21 RX ADMIN — SODIUM CHLORIDE 1000 ML: 9 INJECTION, SOLUTION INTRAVENOUS at 01:47

## 2018-12-21 RX ADMIN — CEFTRIAXONE SODIUM 1 G: 10 INJECTION, POWDER, FOR SOLUTION INTRAVENOUS at 00:51

## 2018-12-21 ASSESSMENT — ENCOUNTER SYMPTOMS
SHORTNESS OF BREATH: 0
DIARRHEA: 0
CHEST TIGHTNESS: 0
NAUSEA: 0
ABDOMINAL PAIN: 0
VOMITING: 0

## 2018-12-21 NOTE — ED PROVIDER NOTES
Gastrointestinal: Negative for abdominal pain, diarrhea, nausea and vomiting. Genitourinary: Negative for dysuria. Musculoskeletal:        Left hip pain   Neurological: Positive for headaches. All other systems reviewed and are negative. Positives and Pertinent negatives as per HPI. Except as noted abovein the ROS, all other systems were reviewed and negative.        PAST MEDICAL HISTORY     Past Medical History:   Diagnosis Date    Allergic rhinitis due to pollen     Arthritis     Breast cancer ([address]     left     Breast cancer, left breast ([address] 2/27/2012    chemo and radiation    Bronchitis, mucopurulent recurrent ([address]     Cerebrovascular disease     CVA x 4, last mid August 2016    COPD (chronic obstructive pulmonary disease) ([address]     Coronary artery disease     stent June 2010    DDD (degenerative disc disease), lumbosacral 11/28/2012    Depression     Diabetes mellitus ([address]     Diabetes mellitus out of control ([address] 2004    Diabetic retinopathy ([facility] 1/3/2013    Hand pain     and feet neuroapthy from DM    Herniated lumbar disc without myelopathy     HTN (hypertension)     Hyperlipidemia LDL goal <70     Insomnia     Irritable bowel syndrome     Neuropathy     of feet    osteoporosis     Stroke (cerebrum) ([address] Dec 11 2015    TIA 6/1/2012    Tobacco abuse     current         SURGICAL HISTORY     Past Surgical History:   Procedure Laterality Date    BREAST SURGERY  03/12/12    left breast segmental mastectomy    COLONOSCOPY      CORONARY ANGIOPLASTY WITH STENT PLACEMENT      2010    CORONARY ANGIOPLASTY WITH STENT PLACEMENT  12/2014    INSERTABLE CARDIAC MONITOR      TONSILLECTOMY      TUBAL LIGATION      TUNNELED VENOUS PORT PLACEMENT  6/6/12    UPPER GASTROINTESTINAL ENDOSCOPY N/A 11/23/2018    EGD  ABLATION/OTHER performed by Lobito Slaughter MD at [phone]       Discharge Medication List as of 12/21/2018  4:43 AM chart in the absence of a cardiologist.  Please see their note for interpretation of EKG. RADIOLOGY:   Non-plain film images such as CT, Ultrasound and MRI are read by the radiologist. Plain radiographic images are visualized andpreliminarily interpreted by the  ED Provider with the below findings:        Interpretation perthe Radiologist below, if available at the time of this note:    CT HIP LEFT WO CONTRAST   Final Result   No evidence for fracture. CT Cervical Spine WO Contrast   Final Result   No acute fracture or traumatic malalignment. There is mild reversal the normal cervical lordosis, which may be secondary   to patient positioning but also raise the possibility of muscle spasm. CT Head WO Contrast   Final Result   No acute intracranial abnormality. Moderate severe chronic small vessel ischemic disease with chronic brain   infarcts both basal ganglia. XR HIP 2-3 VW W PELVIS LEFT   Final Result   No acute abnormality detected. Ct Head Wo Contrast    Result Date: 12/20/2018  EXAMINATION: CT OF THE HEAD WITHOUT CONTRAST  12/20/2018 8:59 pm TECHNIQUE: CT of the head was performed without the administration of intravenous contrast. Dose modulation, iterative reconstruction, and/or weight based adjustment of the mA/kV was utilized to reduce the radiation dose to as low as reasonably achievable. MRI brain 10/10/2018. HISTORY: ORDERING SYSTEM PROVIDED HISTORY: fall on [employer] TECHNOLOGIST PROVIDED HISTORY: Has a \"code stroke\" or \"stroke alert\" been called? ->No Ordering Physician Provided Reason for Exam: fall on Eliquis Acuity: Acute Type of Exam: Initial Relevant Medical/Surgical History: Fall (Rolled out of bed Tues. Pt mentioned to therapist today, family unaware. Pt hit head on night stand. Five previous strokes. Only complaint is headache) FINDINGS: BRAIN/VENTRICLES: There is no acute intracranial hemorrhage, mass effect or midline shift.  No abnormal extra-axial fluid collection. The gray-white differentiation is maintained without evidence of an acute infarct. There is prominence of the ventricles and sulci due to global parenchymal volume loss. There are moderate to severe confluent nonspecific areas of hypoattenuation within the periventricular and subcortical white matter, which likely represent chronic microvascular ischemic change. The chronically lacunar infarct identified involving both basal ganglia. ORBITS: The visualized portion of the orbits demonstrate no acute abnormality. SINUSES: The visualized paranasal sinuses and mastoid air cells demonstrate no acute abnormality. SOFT TISSUES/SKULL: No depressed skull fracture. Mild soft tissue swelling identified posterior skull vertex. No acute intracranial abnormality. Moderate severe chronic small vessel ischemic disease with chronic brain infarcts both basal ganglia. Ct Cervical Spine Wo Contrast    Result Date: 12/20/2018  EXAMINATION: CT OF THE CERVICAL SPINE WITHOUT CONTRAST 12/20/2018 6:01 pm TECHNIQUE: CT of the cervical spine was performed without the administration of intravenous contrast. Multiplanar reformatted images are provided for review. Dose modulation, iterative reconstruction, and/or weight based adjustment of the mA/kV was utilized to reduce the radiation dose to as low as reasonably achievable. COMPARISON: None. HISTORY: ORDERING SYSTEM PROVIDED HISTORY: fall out of bed TECHNOLOGIST PROVIDED HISTORY: If patient is on cardiac monitor and/or pulse ox, they may be taken off cardiac monitor and pulse ox, left on O2 if currently on. All monitors reattached when patient returns to room. Ordering Physician Provided Reason for Exam: fall out of bed Acuity: Acute Type of Exam: Initial Relevant Medical/Surgical History: Fall (Rolled out of bed Tues. Pt mentioned to therapist today, family unaware. Pt hit head on night stand. Five previous strokes.  Only complaint is headache) FINDINGS: FRACTURE, INTRACRANIAL HEMORRHAGE, CERVICAL SPINE INJURY, SUBDURAL OR EPIDURAL HEMATOMA,  thus I consider the discharge disposition reasonable. FINAL IMPRESSION      1. Dehydration    2. Closed head injury, initial encounter    3. Fall from bed, initial encounter    4.  Difficulty walking          DISPOSITION/PLAN   DISPOSITION Decision To Discharge 12/21/2018 04:24:55 AM      PATIENT REFERREDTO:  Luiz Hand  [address]  [place] [address] [phone]    Schedule an appointment as soon as possible for a visit [facility] Emergency Department  [address] [place] [address]  [phone]    If symptoms worsen      DISCHARGE MEDICATIONS:  Discharge Medication List as of 12/21/2018  4:43 AM          DISCONTINUED MEDICATIONS:  Discharge Medication List as of 12/21/2018  4:43 AM                 (Please note that portions ofthis note were completed with a voice recognition program.  Efforts were made to edit the dictations but occasionally words are mis-transcribed.)    GREGOR Santana CNP (electronically signed)           GREGOR Santana CNP  12/21/18  [facility], APRN - CNP  12/21/18 2021

## 2018-12-21 NOTE — ED NOTES
CT scan done - lights dimmed in room to rest. Call good in reach.      Bernie Raygoza RN  12/21/18 7195

## 2018-12-21 NOTE — ED PROVIDER NOTES
Temp Temp src Pulse Resp SpO2 Height Weight   -- -- -- -- -- -- -- --     Vitals:    12/20/18 2030   BP: 96/63   Pulse: 88   Resp: 16   Temp: 97.9 °F (36.6 °C)   SpO2: 100%       Focused physical examination of this well-appearing patient in no evidence of acute distress, non-labored breathing and skin without amelia diaphoresis. Mental status grossly normal.  No obvious facial droop. Sluggish yet comprehensible speach pattern. Cardiovascular: Regular rate and rhythm. No murmurs, gallops or rubs. Respiratory: No evidence of acute respiratory distress. Lungs clear to auscultation bilaterally. No wheezes, rhonchi or rhales. GI: Abdomen soft, NT/ND. No rigidity, rebound, or guarding. Normal bowel sounds. No CVA tenderness. Brief neurologic: Alert and speech appropriate. Face is symmetric. PLAN:  Labs Reviewed   CBC WITH AUTO DIFFERENTIAL   COMPREHENSIVE METABOLIC PANEL W/ REFLEX TO MG FOR LOW K   TROPONIN   BRAIN NATRIURETIC PEPTIDE   PROTIME-INR   URINE RT REFLEX TO CULTURE     CT HEAD WO CONTRAST  CT CERVICAL SPINE WO CONTRAST  XR HIP 2-3 VW W PELVIS LEFT  EKG 12-LEAD    Initial triage orders placed on this patient are as above. Please see notes from other emergency department providers regarding comprehensive evaluation including full history, physical examination, interpretation of results, and medical decision making/disposition.          Sherin Terrazas PA-C  12/20/18 2036

## 2018-12-21 NOTE — ED NOTES
Pt c/o increased weakness. Pt fell out of bed 2 days ago and hit her head on the nightstand. Continues to c/o headache. Pt's portacath accessed with blood drawn and sent to lab. Tolerated procedure. Pt resting with family at bedside. Comfort measures maintained. Call good in reach.      Brianda Avalos RN  12/21/18 0475

## 2018-12-21 NOTE — ED NOTES
Pt and  eating a turkey sandwich without difficulty. No complaints verbalized. NS infusing IV.      Zuhair Ye RN  12/21/18 2546

## 2018-12-21 NOTE — PLAN OF CARE
I was called early on on this patient for UTI, fall early this week  Recommended to repeat UA and ct hip if in pain and discuss with family  Did not hear back  Repeated UA without pyuria, ct negative for fracture.  Patient apparently was discharged to follow with PCP today

## 2018-12-21 NOTE — ED PROVIDER NOTES
walking        Blood pressure (!) 150/67, pulse 76, temperature 97.9 °F (36.6 °C), temperature source Oral, resp. rate 18, weight 148 lb (67.1 kg), SpO2 99 %, not currently breastfeeding. For further details of [facility] emergency department encounter, please see documentation by advanced practice provider.       Tim Michael MD  12/21/18 1094

## 2018-12-22 LAB — URINE CULTURE, ROUTINE: NORMAL

## 2019-02-04 ENCOUNTER — OFFICE VISIT (OUTPATIENT)
Dept: CARDIOLOGY CLINIC | Age: 64
End: 2019-02-04
Payer: MEDICARE

## 2019-02-04 ENCOUNTER — PROCEDURE VISIT (OUTPATIENT)
Dept: CARDIOLOGY CLINIC | Age: 64
End: 2019-02-04
Payer: MEDICARE

## 2019-02-04 VITALS
WEIGHT: 143 LBS | HEIGHT: 64 IN | DIASTOLIC BLOOD PRESSURE: 68 MMHG | SYSTOLIC BLOOD PRESSURE: 104 MMHG | BODY MASS INDEX: 24.41 KG/M2 | HEART RATE: 74 BPM

## 2019-02-04 DIAGNOSIS — I48.0 PAF (PAROXYSMAL ATRIAL FIBRILLATION) (HCC): ICD-10-CM

## 2019-02-04 DIAGNOSIS — I48.20 CHRONIC ATRIAL FIBRILLATION (HCC): ICD-10-CM

## 2019-02-04 DIAGNOSIS — I34.0 SEVERE MITRAL REGURGITATION: Chronic | ICD-10-CM

## 2019-02-04 DIAGNOSIS — Z45.09 ENCOUNTER FOR ELECTRONIC ANALYSIS OF REVEAL EVENT RECORDER: ICD-10-CM

## 2019-02-04 DIAGNOSIS — I25.10 CORONARY ARTERY DISEASE INVOLVING NATIVE CORONARY ARTERY OF NATIVE HEART WITHOUT ANGINA PECTORIS: Chronic | ICD-10-CM

## 2019-02-04 DIAGNOSIS — Z45.09 ENCOUNTER FOR ELECTRONIC ANALYSIS OF REVEAL EVENT RECORDER: Primary | ICD-10-CM

## 2019-02-04 DIAGNOSIS — I63.9 ISCHEMIC STROKE (HCC): ICD-10-CM

## 2019-02-04 DIAGNOSIS — I63.50 CEREBROVASCULAR ACCIDENT (CVA) DUE TO OCCLUSION OF CEREBRAL ARTERY (HCC): Chronic | ICD-10-CM

## 2019-02-04 DIAGNOSIS — I63.232 ARTERIAL ISCHEMIC STROKE, ICA, LEFT, ACUTE (HCC): ICD-10-CM

## 2019-02-04 PROCEDURE — G8598 ASA/ANTIPLAT THER USED: HCPCS | Performed by: INTERNAL MEDICINE

## 2019-02-04 PROCEDURE — G8427 DOCREV CUR MEDS BY ELIG CLIN: HCPCS | Performed by: INTERNAL MEDICINE

## 2019-02-04 PROCEDURE — 3017F COLORECTAL CA SCREEN DOC REV: CPT | Performed by: INTERNAL MEDICINE

## 2019-02-04 PROCEDURE — G8484 FLU IMMUNIZE NO ADMIN: HCPCS | Performed by: INTERNAL MEDICINE

## 2019-02-04 PROCEDURE — G8420 CALC BMI NORM PARAMETERS: HCPCS | Performed by: INTERNAL MEDICINE

## 2019-02-04 PROCEDURE — 93291 INTERROG DEV EVAL SCRMS IP: CPT | Performed by: INTERNAL MEDICINE

## 2019-02-04 PROCEDURE — 93000 ELECTROCARDIOGRAM COMPLETE: CPT | Performed by: INTERNAL MEDICINE

## 2019-02-04 PROCEDURE — 99214 OFFICE O/P EST MOD 30 MIN: CPT | Performed by: INTERNAL MEDICINE

## 2019-02-04 PROCEDURE — 4004F PT TOBACCO SCREEN RCVD TLK: CPT | Performed by: INTERNAL MEDICINE

## 2019-03-13 RX ORDER — ATORVASTATIN CALCIUM 40 MG/1
40 TABLET, FILM COATED ORAL NIGHTLY
Qty: 90 TABLET | Refills: 1 | Status: SHIPPED | OUTPATIENT
Start: 2019-03-13

## 2019-05-09 ENCOUNTER — TELEPHONE (OUTPATIENT)
Dept: CARDIOLOGY CLINIC | Age: 64
End: 2019-05-09

## 2019-05-09 RX ORDER — DILTIAZEM HYDROCHLORIDE 180 MG/1
CAPSULE, EXTENDED RELEASE ORAL
Qty: 90 CAPSULE | Refills: 3 | Status: SHIPPED | OUTPATIENT
Start: 2019-05-09

## 2019-05-09 RX ORDER — PANTOPRAZOLE SODIUM 40 MG/1
TABLET, DELAYED RELEASE ORAL
Qty: 90 TABLET | Refills: 0 | Status: SHIPPED | OUTPATIENT
Start: 2019-05-09

## 2019-05-09 NOTE — TELEPHONE ENCOUNTER
Sent the refill encounter from 5/6/19 to [facility] to get approved. Changed the quantity on Protonix and Cartia scripts from 30 to 90. Please address the refill encounter.

## 2019-05-09 NOTE — TELEPHONE ENCOUNTER
calling to see if someone would change the refills that are pending to 90 day supply and send them to the pharmacy today . The pharmacy called on Monday and she is out of meds today .

## 2019-05-09 NOTE — TELEPHONE ENCOUNTER
Please advise. Refills are pended to [facility]     calling to see if someone would change the refills that are pending to 90 day supply and send them to the pharmacy today .  The pharmacy called on Monday and she is out of meds today

## 2020-01-31 ENCOUNTER — TELEPHONE (OUTPATIENT)
Dept: CARDIOLOGY CLINIC | Age: 65
End: 2020-01-31

## 2020-07-10 ENCOUNTER — TELEPHONE (OUTPATIENT)
Dept: CARDIOLOGY CLINIC | Age: 65
End: 2020-07-10

## 2020-07-10 NOTE — TELEPHONE ENCOUNTER
Noted. Please reschedule with Dr. Cassie Cassidy in 3-4 months if pt agreeable. We will check device interrogation.      GREGOR Manning-CNP

## 2020-07-13 ENCOUNTER — TELEPHONE (OUTPATIENT)
Dept: CARDIOLOGY CLINIC | Age: 65
End: 2020-07-13

## 2020-10-16 ENCOUNTER — TELEPHONE (OUTPATIENT)
Dept: CARDIOLOGY CLINIC | Age: 65
End: 2020-10-16

## 2020-10-16 NOTE — TELEPHONE ENCOUNTER
Patient has an appt on Tuesday with TONI. Patient lives at [facility] and there have been positive covid patients there recently . She tested negative on 10/12 and will be tested again on 10/17. Is it ok for her to keep her appt on Tuesday ? There is no way she can quarantine from the time of neg test until appt .

## 2020-11-03 PROBLEM — I10 ESSENTIAL HYPERTENSION: Status: RESOLVED | Noted: 2020-11-03 | Resolved: 2020-11-03

## (undated) DEVICE — FIAPC® PROBE W/ FILTER 2200 A OD 2.3MM/6.9FR; L 2.2M/7.2FT: Brand: ERBE

## (undated) DEVICE — PROCEDURE KIT ENDOSCP CUST

## (undated) DEVICE — MOUTHPIECE ENDOSCP L CTRL OPN AND SIDE PORTS DISP

## (undated) DEVICE — BW-412T DISP COMBO CLEANING BRUSH: Brand: SINGLE USE COMBINATION CLEANING BRUSH

## (undated) DEVICE — ELECTRODE PT RET AD L9FT HI MOIST COND ADH HYDRGEL CORDED

## (undated) DEVICE — SOLUTION IV IRRIG WATER 500ML POUR BRL ST 2F7113